# Patient Record
Sex: FEMALE | Race: BLACK OR AFRICAN AMERICAN | NOT HISPANIC OR LATINO | URBAN - METROPOLITAN AREA
[De-identification: names, ages, dates, MRNs, and addresses within clinical notes are randomized per-mention and may not be internally consistent; named-entity substitution may affect disease eponyms.]

---

## 2017-01-18 ENCOUNTER — EMERGENCY (EMERGENCY)
Facility: HOSPITAL | Age: 32
LOS: 1 days | Discharge: HOME | End: 2017-01-18
Attending: EMERGENCY MEDICINE | Admitting: EMERGENCY MEDICINE
Payer: COMMERCIAL

## 2017-01-18 VITALS
HEART RATE: 100 BPM | SYSTOLIC BLOOD PRESSURE: 150 MMHG | RESPIRATION RATE: 16 BRPM | OXYGEN SATURATION: 100 % | TEMPERATURE: 99 F | DIASTOLIC BLOOD PRESSURE: 91 MMHG

## 2017-01-18 VITALS
SYSTOLIC BLOOD PRESSURE: 123 MMHG | RESPIRATION RATE: 22 BRPM | HEART RATE: 91 BPM | TEMPERATURE: 98 F | OXYGEN SATURATION: 100 % | DIASTOLIC BLOOD PRESSURE: 70 MMHG

## 2017-01-18 LAB
ALBUMIN SERPL ELPH-MCNC: 4.1 G/DL — SIGNIFICANT CHANGE UP (ref 3.3–5)
ALP SERPL-CCNC: 93 U/L — SIGNIFICANT CHANGE UP (ref 40–120)
ALT FLD-CCNC: 12 U/L — SIGNIFICANT CHANGE UP (ref 4–33)
APPEARANCE UR: SIGNIFICANT CHANGE UP
AST SERPL-CCNC: 17 U/L — SIGNIFICANT CHANGE UP (ref 4–32)
BASOPHILS # BLD AUTO: 0.01 K/UL — SIGNIFICANT CHANGE UP (ref 0–0.2)
BASOPHILS NFR BLD AUTO: 0.1 % — SIGNIFICANT CHANGE UP (ref 0–2)
BILIRUB SERPL-MCNC: 0.4 MG/DL — SIGNIFICANT CHANGE UP (ref 0.2–1.2)
BILIRUB UR-MCNC: NEGATIVE — SIGNIFICANT CHANGE UP
BLOOD UR QL VISUAL: NEGATIVE — SIGNIFICANT CHANGE UP
BUN SERPL-MCNC: 12 MG/DL — SIGNIFICANT CHANGE UP (ref 7–23)
CALCIUM SERPL-MCNC: 9 MG/DL — SIGNIFICANT CHANGE UP (ref 8.4–10.5)
CHLORIDE SERPL-SCNC: 100 MMOL/L — SIGNIFICANT CHANGE UP (ref 98–107)
CO2 SERPL-SCNC: 24 MMOL/L — SIGNIFICANT CHANGE UP (ref 22–31)
COLOR SPEC: YELLOW — SIGNIFICANT CHANGE UP
CREAT SERPL-MCNC: 0.92 MG/DL — SIGNIFICANT CHANGE UP (ref 0.5–1.3)
EOSINOPHIL # BLD AUTO: 0.02 K/UL — SIGNIFICANT CHANGE UP (ref 0–0.5)
EOSINOPHIL NFR BLD AUTO: 0.2 % — SIGNIFICANT CHANGE UP (ref 0–6)
GLUCOSE SERPL-MCNC: 91 MG/DL — SIGNIFICANT CHANGE UP (ref 70–99)
GLUCOSE UR-MCNC: NEGATIVE — SIGNIFICANT CHANGE UP
HCT VFR BLD CALC: 40.4 % — SIGNIFICANT CHANGE UP (ref 34.5–45)
HGB BLD-MCNC: 12.8 G/DL — SIGNIFICANT CHANGE UP (ref 11.5–15.5)
IMM GRANULOCYTES NFR BLD AUTO: 0.2 % — SIGNIFICANT CHANGE UP (ref 0–1.5)
KETONES UR-MCNC: SIGNIFICANT CHANGE UP
LEUKOCYTE ESTERASE UR-ACNC: NEGATIVE — SIGNIFICANT CHANGE UP
LIDOCAIN IGE QN: 36 U/L — SIGNIFICANT CHANGE UP (ref 7–60)
LYMPHOCYTES # BLD AUTO: 1.02 K/UL — SIGNIFICANT CHANGE UP (ref 1–3.3)
LYMPHOCYTES # BLD AUTO: 12.7 % — LOW (ref 13–44)
MCHC RBC-ENTMCNC: 27 PG — SIGNIFICANT CHANGE UP (ref 27–34)
MCHC RBC-ENTMCNC: 31.7 % — LOW (ref 32–36)
MCV RBC AUTO: 85.2 FL — SIGNIFICANT CHANGE UP (ref 80–100)
MONOCYTES # BLD AUTO: 0.72 K/UL — SIGNIFICANT CHANGE UP (ref 0–0.9)
MONOCYTES NFR BLD AUTO: 9 % — SIGNIFICANT CHANGE UP (ref 2–14)
MUCOUS THREADS # UR AUTO: SIGNIFICANT CHANGE UP
NEUTROPHILS # BLD AUTO: 6.25 K/UL — SIGNIFICANT CHANGE UP (ref 1.8–7.4)
NEUTROPHILS NFR BLD AUTO: 77.8 % — HIGH (ref 43–77)
NITRITE UR-MCNC: NEGATIVE — SIGNIFICANT CHANGE UP
PH UR: 6 — SIGNIFICANT CHANGE UP (ref 4.6–8)
PLATELET # BLD AUTO: 376 K/UL — SIGNIFICANT CHANGE UP (ref 150–400)
PMV BLD: 9.7 FL — SIGNIFICANT CHANGE UP (ref 7–13)
POTASSIUM SERPL-MCNC: 3.8 MMOL/L — SIGNIFICANT CHANGE UP (ref 3.5–5.3)
POTASSIUM SERPL-SCNC: 3.8 MMOL/L — SIGNIFICANT CHANGE UP (ref 3.5–5.3)
PROT SERPL-MCNC: 7.8 G/DL — SIGNIFICANT CHANGE UP (ref 6–8.3)
PROT UR-MCNC: 20 — SIGNIFICANT CHANGE UP
RBC # BLD: 4.74 M/UL — SIGNIFICANT CHANGE UP (ref 3.8–5.2)
RBC # FLD: 13.5 % — SIGNIFICANT CHANGE UP (ref 10.3–14.5)
RBC CASTS # UR COMP ASSIST: SIGNIFICANT CHANGE UP (ref 0–?)
SODIUM SERPL-SCNC: 138 MMOL/L — SIGNIFICANT CHANGE UP (ref 135–145)
SP GR SPEC: 1.03 — HIGH (ref 1–1.03)
SQUAMOUS # UR AUTO: SIGNIFICANT CHANGE UP
UROBILINOGEN FLD QL: NORMAL E.U. — SIGNIFICANT CHANGE UP (ref 0.1–0.2)
WBC # BLD: 8.04 K/UL — SIGNIFICANT CHANGE UP (ref 3.8–10.5)
WBC # FLD AUTO: 8.04 K/UL — SIGNIFICANT CHANGE UP (ref 3.8–10.5)
WBC UR QL: SIGNIFICANT CHANGE UP (ref 0–?)

## 2017-01-18 PROCEDURE — 99284 EMERGENCY DEPT VISIT MOD MDM: CPT

## 2017-01-18 RX ORDER — ONDANSETRON 8 MG/1
4 TABLET, FILM COATED ORAL ONCE
Qty: 0 | Refills: 0 | Status: COMPLETED | OUTPATIENT
Start: 2017-01-18 | End: 2017-01-18

## 2017-01-18 RX ORDER — KETOROLAC TROMETHAMINE 30 MG/ML
15 SYRINGE (ML) INJECTION ONCE
Qty: 0 | Refills: 0 | Status: DISCONTINUED | OUTPATIENT
Start: 2017-01-18 | End: 2017-01-18

## 2017-01-18 RX ORDER — SODIUM CHLORIDE 9 MG/ML
1000 INJECTION INTRAMUSCULAR; INTRAVENOUS; SUBCUTANEOUS ONCE
Qty: 0 | Refills: 0 | Status: COMPLETED | OUTPATIENT
Start: 2017-01-18 | End: 2017-01-18

## 2017-01-18 RX ADMIN — Medication 15 MILLIGRAM(S): at 16:26

## 2017-01-18 RX ADMIN — SODIUM CHLORIDE 1000 MILLILITER(S): 9 INJECTION INTRAMUSCULAR; INTRAVENOUS; SUBCUTANEOUS at 14:14

## 2017-01-18 RX ADMIN — ONDANSETRON 4 MILLIGRAM(S): 8 TABLET, FILM COATED ORAL at 14:14

## 2017-01-18 NOTE — ED PROVIDER NOTE - ATTENDING CONTRIBUTION TO CARE
ED Attending Dr. Collier: 30 yo female with pre-eclampsia in the past, in ED with N/V/D and low abdominal pain beginning yesterday after eating pork chops, macaroni and cheese and beans and rice.  No others who ate the same food are ill as far as pt knows.  No dysuria, hematuria or CP/SOB.  On exam pt uncomfortable appearing, heart RRR, lungs CTAB, abd TTP in bilateral lower quadrants, extremities without swelling, strength 5/5 in all extremities and skin without rash.

## 2017-01-18 NOTE — ED PROVIDER NOTE - PROGRESS NOTE DETAILS
pt states abd pain and nausea resolved pt reports leg pain - toradol, po challenge and reassess PT seen and reassessed.  Patient symptomatically improved.   AAOX3, NAD, VSS.  Discussed test results w/ patient, given copy of results. Patient verbalized understanding of hospital course and outpatient plans, has decisional making capacity.  Will follow-up with Primary care doctor in the next 2 days; patient will call for an appointment. Will return to the ED if there is any worsening of symptoms.  Patient able to ambulate w/o difficulty, is tolerating PO intake

## 2017-01-18 NOTE — ED PROVIDER NOTE - MEDICAL DECISION MAKING DETAILS
32yo female with pmh of pre-eclampsia,  (csxn), LMP 1/10/17 presents with n/v/d/abd pain - likely gastro r/o pancreatitis and preg - labs, ivf, antiemetic, po trial

## 2017-01-18 NOTE — ED ADULT TRIAGE NOTE - CHIEF COMPLAINT QUOTE
c/o lower abdominal pain since this A.M., with N/V/D, and generalized body aches with fever. LMP: 01/10/17 PMH: pre eclampsia

## 2017-01-18 NOTE — ED ADULT NURSE NOTE - OBJECTIVE STATEMENT
Pt c/o lower abdominal pain n/v/d starting last night along with productive cough x 2 weeks and starting to feel feverish. Abdomen soft and tender in lower quadrants worse on lower right. Pt denies dysuria, chest pain, SOB, vaginal bleeding.

## 2017-01-18 NOTE — ED PROVIDER NOTE - OBJECTIVE STATEMENT
30yo female with pmh of pre-eclampsia,  (csxn), LMP 1/10/17 presents with n/v/d/abd pain. Pt states yesterday around 2pm she ate pork chops, mac and cheese and about 6-7 hours later started to have nausea, vomiting and diarrhea after several episodes of nbnb emesis she started to have abd pain. Pt states many bouts of watery diarrhea. Pt reports subjective fever today. Pt denies urinary symptoms, vaginal discharge/bleeding, cp/sob/palp, recent travel and sickness.

## 2017-06-26 PROBLEM — O14.90 UNSPECIFIED PRE-ECLAMPSIA, UNSPECIFIED TRIMESTER: Chronic | Status: ACTIVE | Noted: 2017-01-18

## 2017-07-17 ENCOUNTER — APPOINTMENT (OUTPATIENT)
Dept: OBGYN | Facility: CLINIC | Age: 32
End: 2017-07-17

## 2017-07-17 VITALS
BODY MASS INDEX: 39.27 KG/M2 | SYSTOLIC BLOOD PRESSURE: 140 MMHG | HEIGHT: 64 IN | WEIGHT: 230 LBS | DIASTOLIC BLOOD PRESSURE: 82 MMHG

## 2017-07-17 DIAGNOSIS — Z3A.17 17 WEEKS GESTATION OF PREGNANCY: ICD-10-CM

## 2017-07-17 DIAGNOSIS — O14.93 UNSPECIFIED PRE-ECLAMPSIA, THIRD TRIMESTER: ICD-10-CM

## 2017-07-17 DIAGNOSIS — N76.2 ACUTE VULVITIS: ICD-10-CM

## 2017-07-17 DIAGNOSIS — Z87.898 PERSONAL HISTORY OF OTHER SPECIFIED CONDITIONS: ICD-10-CM

## 2017-07-17 DIAGNOSIS — N88.3 INCOMPETENCE OF CERVIX UTERI: ICD-10-CM

## 2017-07-17 DIAGNOSIS — Z87.59 PERSONAL HISTORY OF OTHER COMPLICATIONS OF PREGNANCY, CHILDBIRTH AND THE PUERPERIUM: ICD-10-CM

## 2017-07-17 DIAGNOSIS — Z34.90 ENCOUNTER FOR SUPERVISION OF NORMAL PREGNANCY, UNSPECIFIED, UNSPECIFIED TRIMESTER: ICD-10-CM

## 2017-07-17 DIAGNOSIS — R63.3 FEEDING DIFFICULTIES: ICD-10-CM

## 2017-07-18 LAB
CANDIDA VAG CYTO: NOT DETECTED
G VAGINALIS+PREV SP MTYP VAG QL MICRO: NOT DETECTED
T VAGINALIS VAG QL WET PREP: NOT DETECTED

## 2017-07-19 LAB — HPV HIGH+LOW RISK DNA PNL CVX: NEGATIVE

## 2017-07-21 LAB
BACTERIA GENITAL AEROBE CULT: ABNORMAL
CYTOLOGY CVX/VAG DOC THIN PREP: NORMAL

## 2017-12-13 ENCOUNTER — TRANSCRIPTION ENCOUNTER (OUTPATIENT)
Age: 32
End: 2017-12-13

## 2018-07-19 ENCOUNTER — TRANSCRIPTION ENCOUNTER (OUTPATIENT)
Age: 33
End: 2018-07-19

## 2018-08-06 ENCOUNTER — APPOINTMENT (OUTPATIENT)
Dept: OBGYN | Facility: CLINIC | Age: 33
End: 2018-08-06
Payer: COMMERCIAL

## 2018-08-06 PROCEDURE — 99243 OFF/OP CNSLTJ NEW/EST LOW 30: CPT

## 2018-08-22 ENCOUNTER — OUTPATIENT (OUTPATIENT)
Dept: OUTPATIENT SERVICES | Facility: HOSPITAL | Age: 33
LOS: 1 days | Discharge: HOME | End: 2018-08-22

## 2018-08-22 DIAGNOSIS — D64.9 ANEMIA, UNSPECIFIED: ICD-10-CM

## 2018-08-24 ENCOUNTER — APPOINTMENT (OUTPATIENT)
Dept: MRI IMAGING | Facility: IMAGING CENTER | Age: 33
End: 2018-08-24
Payer: COMMERCIAL

## 2018-08-24 ENCOUNTER — OUTPATIENT (OUTPATIENT)
Dept: OUTPATIENT SERVICES | Facility: HOSPITAL | Age: 33
LOS: 1 days | End: 2018-08-24
Payer: COMMERCIAL

## 2018-08-24 DIAGNOSIS — Z00.8 ENCOUNTER FOR OTHER GENERAL EXAMINATION: ICD-10-CM

## 2018-08-24 PROCEDURE — 72197 MRI PELVIS W/O & W/DYE: CPT | Mod: 26

## 2018-08-24 PROCEDURE — 72197 MRI PELVIS W/O & W/DYE: CPT

## 2018-08-24 PROCEDURE — A9585: CPT

## 2018-08-31 ENCOUNTER — RESULT REVIEW (OUTPATIENT)
Age: 33
End: 2018-08-31

## 2018-08-31 ENCOUNTER — APPOINTMENT (OUTPATIENT)
Dept: HEMATOLOGY ONCOLOGY | Facility: CLINIC | Age: 33
End: 2018-08-31
Payer: COMMERCIAL

## 2018-08-31 VITALS
DIASTOLIC BLOOD PRESSURE: 70 MMHG | SYSTOLIC BLOOD PRESSURE: 130 MMHG | HEIGHT: 63.98 IN | OXYGEN SATURATION: 100 % | BODY MASS INDEX: 36.13 KG/M2 | WEIGHT: 211.64 LBS | RESPIRATION RATE: 16 BRPM | HEART RATE: 94 BPM

## 2018-08-31 DIAGNOSIS — Z87.42 PERSONAL HISTORY OF OTHER DISEASES OF THE FEMALE GENITAL TRACT: ICD-10-CM

## 2018-08-31 DIAGNOSIS — K21.9 GASTRO-ESOPHAGEAL REFLUX DISEASE W/OUT ESOPHAGITIS: ICD-10-CM

## 2018-08-31 DIAGNOSIS — D64.9 ANEMIA, UNSPECIFIED: ICD-10-CM

## 2018-08-31 DIAGNOSIS — Z80.0 FAMILY HISTORY OF MALIGNANT NEOPLASM OF DIGESTIVE ORGANS: ICD-10-CM

## 2018-08-31 DIAGNOSIS — E53.8 DEFICIENCY OF OTHER SPECIFIED B GROUP VITAMINS: ICD-10-CM

## 2018-08-31 DIAGNOSIS — N92.0 EXCESSIVE AND FREQUENT MENSTRUATION WITH REGULAR CYCLE: ICD-10-CM

## 2018-08-31 DIAGNOSIS — E55.9 VITAMIN D DEFICIENCY, UNSPECIFIED: ICD-10-CM

## 2018-08-31 LAB
ALBUMIN SERPL ELPH-MCNC: 4.1 G/DL
ALP BLD-CCNC: 74 U/L
ALT SERPL-CCNC: 9 U/L
AST SERPL-CCNC: 15 U/L
BILIRUB DIRECT SERPL-MCNC: 0.1 MG/DL
BILIRUB INDIRECT SERPL-MCNC: 0.2 MG/DL
BILIRUB SERPL-MCNC: 0.2 MG/DL
FERRITIN SERPL-MCNC: 27 NG/ML
FOLATE SERPL-MCNC: 8.1 NG/ML
HCT VFR BLD CALC: 33.1 % — LOW (ref 34.5–45)
HGB BLD-MCNC: 10.2 G/DL — LOW (ref 11.5–15.5)
IRON SATN MFR SERPL: 5 %
IRON SERPL-MCNC: 20 UG/DL
LDH SERPL-CCNC: 192 U/L
MCHC RBC-ENTMCNC: 23.3 PG — LOW (ref 27–34)
MCHC RBC-ENTMCNC: 30.8 G/DL — LOW (ref 32–36)
MCV RBC AUTO: 75.6 FL — LOW (ref 80–100)
PLATELET # BLD AUTO: 339 K/UL — SIGNIFICANT CHANGE UP (ref 150–400)
PROT SERPL-MCNC: 7 G/DL
RBC # BLD: 4.38 M/UL — SIGNIFICANT CHANGE UP (ref 3.8–5.2)
RBC # FLD: 21.2 % — HIGH (ref 10.3–14.5)
RETICS #: 130 K/UL — HIGH (ref 25–125)
RETICS/RBC NFR: 2.9 % — HIGH (ref 0.5–2.5)
TIBC SERPL-MCNC: 386 UG/DL
UIBC SERPL-MCNC: 366 UG/DL
VIT B12 SERPL-MCNC: 434 PG/ML
WBC # BLD: 6.4 K/UL — SIGNIFICANT CHANGE UP (ref 3.8–10.5)
WBC # FLD AUTO: 6.4 K/UL — SIGNIFICANT CHANGE UP (ref 3.8–10.5)

## 2018-08-31 PROCEDURE — 99205 OFFICE O/P NEW HI 60 MIN: CPT

## 2018-08-31 RX ORDER — CYANOCOBALAMIN/FOLIC ACID 1MG-400MCG
TABLET, SUBLINGUAL SUBLINGUAL
Refills: 0 | Status: ACTIVE | COMMUNITY

## 2018-08-31 RX ORDER — CHLORHEXIDINE GLUCONATE 4 %
325 (65 FE) LIQUID (ML) TOPICAL
Refills: 0 | Status: ACTIVE | COMMUNITY

## 2018-08-31 RX ORDER — TERCONAZOLE 4 MG/G
0.4 CREAM VAGINAL
Qty: 1 | Refills: 1 | Status: DISCONTINUED | COMMUNITY
Start: 2017-07-21 | End: 2018-08-31

## 2018-08-31 RX ORDER — ERGOCALCIFEROL (VITAMIN D2) 1250 MCG
50000 CAPSULE ORAL
Refills: 0 | Status: ACTIVE | COMMUNITY

## 2018-09-05 LAB — METHYLMALONATE SERPL-SCNC: 138 NMOL/L

## 2018-09-08 PROBLEM — Z80.0 FAMILY HISTORY OF PANCREATIC CANCER: Status: ACTIVE | Noted: 2018-09-08

## 2018-09-20 ENCOUNTER — OUTPATIENT (OUTPATIENT)
Dept: OUTPATIENT SERVICES | Facility: HOSPITAL | Age: 33
LOS: 1 days | End: 2018-09-20
Payer: COMMERCIAL

## 2018-09-20 VITALS
RESPIRATION RATE: 15 BRPM | WEIGHT: 210.1 LBS | OXYGEN SATURATION: 100 % | DIASTOLIC BLOOD PRESSURE: 87 MMHG | HEIGHT: 64 IN | TEMPERATURE: 98 F | SYSTOLIC BLOOD PRESSURE: 122 MMHG | HEART RATE: 76 BPM

## 2018-09-20 DIAGNOSIS — Z98.891 HISTORY OF UTERINE SCAR FROM PREVIOUS SURGERY: Chronic | ICD-10-CM

## 2018-09-20 DIAGNOSIS — Z98.890 OTHER SPECIFIED POSTPROCEDURAL STATES: Chronic | ICD-10-CM

## 2018-09-20 DIAGNOSIS — D25.9 LEIOMYOMA OF UTERUS, UNSPECIFIED: ICD-10-CM

## 2018-09-20 DIAGNOSIS — G47.33 OBSTRUCTIVE SLEEP APNEA (ADULT) (PEDIATRIC): ICD-10-CM

## 2018-09-20 DIAGNOSIS — Z01.818 ENCOUNTER FOR OTHER PREPROCEDURAL EXAMINATION: ICD-10-CM

## 2018-09-20 LAB
ANION GAP SERPL CALC-SCNC: 9 MMOL/L — SIGNIFICANT CHANGE UP (ref 5–17)
BLD GP AB SCN SERPL QL: NEGATIVE — SIGNIFICANT CHANGE UP
BUN SERPL-MCNC: 9 MG/DL — SIGNIFICANT CHANGE UP (ref 7–23)
CALCIUM SERPL-MCNC: 9 MG/DL — SIGNIFICANT CHANGE UP (ref 8.4–10.5)
CHLORIDE SERPL-SCNC: 105 MMOL/L — SIGNIFICANT CHANGE UP (ref 96–108)
CO2 SERPL-SCNC: 21 MMOL/L — LOW (ref 22–31)
CREAT SERPL-MCNC: 0.94 MG/DL — SIGNIFICANT CHANGE UP (ref 0.5–1.3)
GLUCOSE SERPL-MCNC: 78 MG/DL — SIGNIFICANT CHANGE UP (ref 70–99)
HCT VFR BLD CALC: 33.5 % — LOW (ref 34.5–45)
HGB BLD-MCNC: 10.2 G/DL — LOW (ref 11.5–15.5)
MCHC RBC-ENTMCNC: 23.2 PG — LOW (ref 27–34)
MCHC RBC-ENTMCNC: 30.4 GM/DL — LOW (ref 32–36)
MCV RBC AUTO: 76.1 FL — LOW (ref 80–100)
PLATELET # BLD AUTO: 406 K/UL — HIGH (ref 150–400)
POTASSIUM SERPL-MCNC: 4.1 MMOL/L — SIGNIFICANT CHANGE UP (ref 3.5–5.3)
POTASSIUM SERPL-SCNC: 4.1 MMOL/L — SIGNIFICANT CHANGE UP (ref 3.5–5.3)
RBC # BLD: 4.4 M/UL — SIGNIFICANT CHANGE UP (ref 3.8–5.2)
RBC # FLD: 22.1 % — HIGH (ref 10.3–14.5)
RH IG SCN BLD-IMP: POSITIVE — SIGNIFICANT CHANGE UP
SODIUM SERPL-SCNC: 135 MMOL/L — SIGNIFICANT CHANGE UP (ref 135–145)
WBC # BLD: 6.07 K/UL — SIGNIFICANT CHANGE UP (ref 3.8–10.5)
WBC # FLD AUTO: 6.07 K/UL — SIGNIFICANT CHANGE UP (ref 3.8–10.5)

## 2018-09-20 PROCEDURE — 86901 BLOOD TYPING SEROLOGIC RH(D): CPT

## 2018-09-20 PROCEDURE — 85027 COMPLETE CBC AUTOMATED: CPT

## 2018-09-20 PROCEDURE — G0463: CPT

## 2018-09-20 PROCEDURE — 86900 BLOOD TYPING SEROLOGIC ABO: CPT

## 2018-09-20 PROCEDURE — 86850 RBC ANTIBODY SCREEN: CPT

## 2018-09-20 PROCEDURE — 80048 BASIC METABOLIC PNL TOTAL CA: CPT

## 2018-09-20 RX ORDER — LIDOCAINE HCL 20 MG/ML
0.2 VIAL (ML) INJECTION ONCE
Qty: 0 | Refills: 0 | Status: DISCONTINUED | OUTPATIENT
Start: 2018-10-04 | End: 2018-10-04

## 2018-09-20 RX ORDER — FAMOTIDINE 10 MG/ML
20 INJECTION INTRAVENOUS ONCE
Qty: 0 | Refills: 0 | Status: COMPLETED | OUTPATIENT
Start: 2018-10-04 | End: 2018-10-04

## 2018-09-20 RX ORDER — ACETAMINOPHEN 500 MG
975 TABLET ORAL ONCE
Qty: 0 | Refills: 0 | Status: COMPLETED | OUTPATIENT
Start: 2018-10-04 | End: 2018-10-04

## 2018-09-20 RX ORDER — SODIUM CHLORIDE 9 MG/ML
3 INJECTION INTRAMUSCULAR; INTRAVENOUS; SUBCUTANEOUS EVERY 8 HOURS
Qty: 0 | Refills: 0 | Status: DISCONTINUED | OUTPATIENT
Start: 2018-10-04 | End: 2018-10-04

## 2018-09-20 RX ORDER — CELECOXIB 200 MG/1
200 CAPSULE ORAL ONCE
Qty: 0 | Refills: 0 | Status: COMPLETED | OUTPATIENT
Start: 2018-10-04 | End: 2018-10-04

## 2018-09-20 RX ORDER — CEFOTETAN DISODIUM 1 G
2 VIAL (EA) INJECTION ONCE
Qty: 0 | Refills: 0 | Status: DISCONTINUED | OUTPATIENT
Start: 2018-10-04 | End: 2018-10-08

## 2018-09-20 NOTE — H&P PST ADULT - PROBLEM SELECTOR PLAN 1
Scheduled exploratory laparotomy, laparoscopic myomectomy on 10/4/2018  Lab work sent at [facility]  Pre-op instructions given to pt, including chlorhexidine soap

## 2018-09-20 NOTE — H&P PST ADULT - HISTORY OF PRESENT ILLNESS
31 y/o AA female with PMHx of anemia (on Iron), DAYANA, asthma (controlled), GERD, 31 y/o AA female with PMHx of anemia (on Iron), DAYANA, asthma (controlled), GERD, c/o menorrhagia and pelvic pain especially during menstruation s/p diagnostic imaging revealed uterine fibroid. Evaluated by Dr. Sainz. Presents to PST for a scheduled exploratory laparotomy, laparoscopic myomectomy on 10/4/2018.

## 2018-09-20 NOTE — H&P PST ADULT - PMH
Anemia  takes Iron, follows bull Rick  Asthma  mild, controlled, never intubated or hospitalized  GERD (gastroesophageal reflux disease)    Leiomyoma of uterus    Pre-eclampsia    Vitamin D deficiency Anemia  takes Iron, follows bull Rick  Asthma  mild, controlled, never intubated or hospitalized  GERD (gastroesophageal reflux disease)    Leiomyoma of uterus    DAYANA (obstructive sleep apnea)  not on CPAP  Pre-eclampsia    Vitamin D deficiency

## 2018-10-03 ENCOUNTER — TRANSCRIPTION ENCOUNTER (OUTPATIENT)
Age: 33
End: 2018-10-03

## 2018-10-04 ENCOUNTER — RESULT REVIEW (OUTPATIENT)
Age: 33
End: 2018-10-04

## 2018-10-04 ENCOUNTER — APPOINTMENT (OUTPATIENT)
Dept: OBGYN | Facility: HOSPITAL | Age: 33
End: 2018-10-04

## 2018-10-04 ENCOUNTER — INPATIENT (INPATIENT)
Facility: HOSPITAL | Age: 33
LOS: 3 days | Discharge: HOME | DRG: 742 | End: 2018-10-08
Attending: OBSTETRICS & GYNECOLOGY | Admitting: OBSTETRICS & GYNECOLOGY
Payer: COMMERCIAL

## 2018-10-04 VITALS
SYSTOLIC BLOOD PRESSURE: 132 MMHG | WEIGHT: 210.1 LBS | HEART RATE: 106 BPM | RESPIRATION RATE: 18 BRPM | DIASTOLIC BLOOD PRESSURE: 79 MMHG | OXYGEN SATURATION: 100 % | TEMPERATURE: 99 F | HEIGHT: 64 IN

## 2018-10-04 DIAGNOSIS — D25.9 LEIOMYOMA OF UTERUS, UNSPECIFIED: ICD-10-CM

## 2018-10-04 DIAGNOSIS — Z09 ENCOUNTER FOR FOLLOW-UP EXAMINATION AFTER COMPLETED TREATMENT FOR CONDITIONS OTHER THAN MALIGNANT NEOPLASM: ICD-10-CM

## 2018-10-04 DIAGNOSIS — Z98.890 OTHER SPECIFIED POSTPROCEDURAL STATES: Chronic | ICD-10-CM

## 2018-10-04 DIAGNOSIS — Z98.891 HISTORY OF UTERINE SCAR FROM PREVIOUS SURGERY: Chronic | ICD-10-CM

## 2018-10-04 LAB — HCG UR QL: NEGATIVE — SIGNIFICANT CHANGE UP

## 2018-10-04 PROCEDURE — 88305 TISSUE EXAM BY PATHOLOGIST: CPT | Mod: 26

## 2018-10-04 PROCEDURE — 58146 MYOMECTOMY ABDOM COMPLEX: CPT | Mod: 80

## 2018-10-04 RX ORDER — HYDROMORPHONE HYDROCHLORIDE 2 MG/ML
0.5 INJECTION INTRAMUSCULAR; INTRAVENOUS; SUBCUTANEOUS
Qty: 0 | Refills: 0 | Status: DISCONTINUED | OUTPATIENT
Start: 2018-10-04 | End: 2018-10-06

## 2018-10-04 RX ORDER — PREGABALIN 225 MG/1
1 CAPSULE ORAL
Qty: 0 | Refills: 0 | COMMUNITY

## 2018-10-04 RX ORDER — NALOXONE HYDROCHLORIDE 4 MG/.1ML
0.1 SPRAY NASAL
Qty: 0 | Refills: 0 | Status: DISCONTINUED | OUTPATIENT
Start: 2018-10-04 | End: 2018-10-06

## 2018-10-04 RX ORDER — SIMETHICONE 80 MG/1
80 TABLET, CHEWABLE ORAL AT BEDTIME
Qty: 0 | Refills: 0 | Status: DISCONTINUED | OUTPATIENT
Start: 2018-10-04 | End: 2018-10-08

## 2018-10-04 RX ORDER — ONDANSETRON 8 MG/1
4 TABLET, FILM COATED ORAL EVERY 6 HOURS
Qty: 0 | Refills: 0 | Status: DISCONTINUED | OUTPATIENT
Start: 2018-10-04 | End: 2018-10-06

## 2018-10-04 RX ORDER — HYDROMORPHONE HYDROCHLORIDE 2 MG/ML
30 INJECTION INTRAMUSCULAR; INTRAVENOUS; SUBCUTANEOUS
Qty: 0 | Refills: 0 | Status: DISCONTINUED | OUTPATIENT
Start: 2018-10-04 | End: 2018-10-05

## 2018-10-04 RX ORDER — PANTOPRAZOLE SODIUM 20 MG/1
40 TABLET, DELAYED RELEASE ORAL
Qty: 0 | Refills: 0 | Status: DISCONTINUED | OUTPATIENT
Start: 2018-10-04 | End: 2018-10-08

## 2018-10-04 RX ORDER — ACETAMINOPHEN 500 MG
1000 TABLET ORAL ONCE
Qty: 0 | Refills: 0 | Status: COMPLETED | OUTPATIENT
Start: 2018-10-04 | End: 2018-10-04

## 2018-10-04 RX ORDER — ALBUTEROL 90 UG/1
2 AEROSOL, METERED ORAL EVERY 6 HOURS
Qty: 0 | Refills: 0 | Status: DISCONTINUED | OUTPATIENT
Start: 2018-10-04 | End: 2018-10-08

## 2018-10-04 RX ORDER — ALBUTEROL 90 UG/1
2 AEROSOL, METERED ORAL
Qty: 0 | Refills: 0 | COMMUNITY

## 2018-10-04 RX ORDER — SODIUM CHLORIDE 9 MG/ML
1000 INJECTION, SOLUTION INTRAVENOUS
Qty: 0 | Refills: 0 | Status: DISCONTINUED | OUTPATIENT
Start: 2018-10-04 | End: 2018-10-05

## 2018-10-04 RX ORDER — DOCUSATE SODIUM 100 MG
100 CAPSULE ORAL THREE TIMES A DAY
Qty: 0 | Refills: 0 | Status: DISCONTINUED | OUTPATIENT
Start: 2018-10-04 | End: 2018-10-05

## 2018-10-04 RX ORDER — PANTOPRAZOLE SODIUM 20 MG/1
1 TABLET, DELAYED RELEASE ORAL
Qty: 0 | Refills: 0 | COMMUNITY

## 2018-10-04 RX ORDER — ERGOCALCIFEROL 1.25 MG/1
1 CAPSULE ORAL
Qty: 0 | Refills: 0 | COMMUNITY

## 2018-10-04 RX ORDER — SODIUM CHLORIDE 9 MG/ML
1000 INJECTION, SOLUTION INTRAVENOUS
Qty: 0 | Refills: 0 | Status: DISCONTINUED | OUTPATIENT
Start: 2018-10-04 | End: 2018-10-04

## 2018-10-04 RX ADMIN — SODIUM CHLORIDE 100 MILLILITER(S): 9 INJECTION, SOLUTION INTRAVENOUS at 12:57

## 2018-10-04 RX ADMIN — SIMETHICONE 80 MILLIGRAM(S): 80 TABLET, CHEWABLE ORAL at 21:39

## 2018-10-04 RX ADMIN — Medication 100 MILLIGRAM(S): at 18:39

## 2018-10-04 RX ADMIN — HYDROMORPHONE HYDROCHLORIDE 30 MILLILITER(S): 2 INJECTION INTRAMUSCULAR; INTRAVENOUS; SUBCUTANEOUS at 16:31

## 2018-10-04 RX ADMIN — CELECOXIB 200 MILLIGRAM(S): 200 CAPSULE ORAL at 07:30

## 2018-10-04 RX ADMIN — FAMOTIDINE 20 MILLIGRAM(S): 10 INJECTION INTRAVENOUS at 07:30

## 2018-10-04 RX ADMIN — HYDROMORPHONE HYDROCHLORIDE 30 MILLILITER(S): 2 INJECTION INTRAMUSCULAR; INTRAVENOUS; SUBCUTANEOUS at 13:00

## 2018-10-04 RX ADMIN — HYDROMORPHONE HYDROCHLORIDE 30 MILLILITER(S): 2 INJECTION INTRAMUSCULAR; INTRAVENOUS; SUBCUTANEOUS at 19:25

## 2018-10-04 RX ADMIN — Medication 400 MILLIGRAM(S): at 14:40

## 2018-10-04 RX ADMIN — Medication 1000 MILLIGRAM(S): at 15:00

## 2018-10-04 RX ADMIN — Medication 975 MILLIGRAM(S): at 07:30

## 2018-10-04 RX ADMIN — HYDROMORPHONE HYDROCHLORIDE 0.5 MILLIGRAM(S): 2 INJECTION INTRAMUSCULAR; INTRAVENOUS; SUBCUTANEOUS at 13:30

## 2018-10-04 RX ADMIN — ONDANSETRON 4 MILLIGRAM(S): 8 TABLET, FILM COATED ORAL at 18:39

## 2018-10-04 RX ADMIN — HYDROMORPHONE HYDROCHLORIDE 30 MILLILITER(S): 2 INJECTION INTRAMUSCULAR; INTRAVENOUS; SUBCUTANEOUS at 17:22

## 2018-10-04 NOTE — PROGRESS NOTE ADULT - PROBLEM SELECTOR PLAN 1
-Neuro - AAO x 3, Pain well controlled with PCA in place  -Heme - hemodynamically stable  -CV - asymptomatic, CBC daily  -Pulm - encourage IS, O2sat   -GI - Diet-  Regular   Advance as Tolerated  - - Lockhart to gravity -clear urine, uterine lockhart in place  -DVT prophylaxis - SCDs in place, encourage ambulation  -Meds:  ALBUTerol    90 MICROgram(s) HFA Inhaler 2 Puff(s) Inhalation every 6 hours PRN  cefoTEtan  IVPB 2 Gram(s) IV Intermittent once  docusate sodium 100 milliGRAM(s) Oral three times a day PRN  doxycycline hyclate Capsule 100 milliGRAM(s) Oral every 12 hours  HYDROmorphone PCA (1 mG/mL) 30 milliLiter(s) PCA Continuous PCA Continuous  HYDROmorphone PCA (1 mG/mL) Rescue Clinician Bolus 0.5 milliGRAM(s) IV Push every 15 minutes PRN lactated ringers. 1000 milliLiter(s) IV Continuous <Continuous>  naloxone Injectable 0.1 milliGRAM(s) IV Push every 3 minutes PRN  ondansetron Injectable 4 milliGRAM(s) IV Push every 6 hours PRN  pantoprazole    Tablet 40 milliGRAM(s) Oral before breakfast  simethicone 80 milliGRAM(s) Chew at bedtime  -Dispo: stable for transfer from PACU, once meeting all PACU milestones

## 2018-10-04 NOTE — BRIEF OPERATIVE NOTE - PRE-OP DX
Intramural leiomyoma of uterus  10/04/2018    Active  Sarah Richards  Subserous leiomyoma of uterus  10/04/2018    Active  Sarah Richards

## 2018-10-04 NOTE — PROGRESS NOTE ADULT - SUBJECTIVE AND OBJECTIVE BOX
POST-OP CHECK  PA Note    Allergies    dust (Rhinorrhea; Rash)  No Known Drug Allergies  shellfish (Anaphylaxis)    Intolerances        S: Pt sleeping, easily arousable  Pain controlled. Pt denies N/V, SOB, CP, palpitations.   negative  PO   lockhart in place  O:   T(C): 36.4 (10-04-18 @ 15:00), Max: 37.3 (10-04-18 @ 12:10)  HR: 66 (10-04-18 @ 15:00) (66 - 86)  BP: 122/66 (10-04-18 @ 15:00) (110/56 - 122/67)  RR: 16 (10-04-18 @ 15:00) (12 - 16)  SpO2: 99% (10-04-18 @ 15:00) (98% - 100%)  Wt(kg): --  I&O's Summary    04 Oct 2018 07:01  -  04 Oct 2018 15:00  --------------------------------------------------------  IN: 300 mL / OUT: 225 mL / NET: 75 mL                         OR           PACU  (I)                              IVF LR@125  (O)  EBL    CV: RRR  Lungs: CTA B/L  Abd: slight BS x4Q, soft, appropriately tender  Inc: Clean/dry dressing in place  Ext: SCDs in place, Neg Hailyns B/L

## 2018-10-04 NOTE — BRIEF OPERATIVE NOTE - OPERATION/FINDINGS
pending EUA: myomatous uterus, 14 weeks in size. Intraoperatively: 13 weeks in size. 2, 3cm myomas present at the uterine fundus. Entry into the cavity on resection of these myomas. Pediatric lockhart placed into the uterus, inflated to 4cc. 9 cm left intramural myoma resected-necrotic. All myometrial defects closed in two layers with 0vlock. Endometrium reapproximated at the fundus with 2-0 vicryl in a running stitch. Evicel used over hysterotomies, Interced used over hysterotomies. Peritoneum reapproximated with 2-0 vicryl in a running stitch. Rectus reapproximated with 2-0 vicryl in two mattress sutures. Subcutaneous fat closed in multiple layers with 2-0 plain gut. Quill used to close skin.

## 2018-10-04 NOTE — PROGRESS NOTE ADULT - ASSESSMENT
A/P: 32y Female S/P abdominal myomectomy, lysis of adhesions, retrograde filled bladder with methylene blue  PAST MEDICAL & SURGICAL HISTORY:  DAYANA (obstructive sleep apnea): not on CPAP  Leiomyoma of uterus  GERD (gastroesophageal reflux disease)  Vitamin D deficiency  Asthma: mild, controlled, never intubated or hospitalized  Anemia: takes Iron, follows bull Rick  Pre-eclampsia  History of cervical cerclage:   History of D&C: missed    History of :

## 2018-10-04 NOTE — BRIEF OPERATIVE NOTE - PROCEDURE
<<-----Click on this checkbox to enter Procedure Abdominal myomectomy  10/04/2018    Active  [email or web address]

## 2018-10-05 ENCOUNTER — TRANSCRIPTION ENCOUNTER (OUTPATIENT)
Age: 33
End: 2018-10-05

## 2018-10-05 DIAGNOSIS — D25.9 LEIOMYOMA OF UTERUS, UNSPECIFIED: ICD-10-CM

## 2018-10-05 LAB
ANION GAP SERPL CALC-SCNC: 5 MMOL/L — SIGNIFICANT CHANGE UP (ref 5–17)
BASOPHILS # BLD AUTO: 0 K/UL — SIGNIFICANT CHANGE UP (ref 0–0.2)
BASOPHILS NFR BLD AUTO: 0.2 % — SIGNIFICANT CHANGE UP (ref 0–2)
BUN SERPL-MCNC: 6 MG/DL — LOW (ref 7–23)
CALCIUM SERPL-MCNC: 8.3 MG/DL — LOW (ref 8.4–10.5)
CHLORIDE SERPL-SCNC: 102 MMOL/L — SIGNIFICANT CHANGE UP (ref 96–108)
CO2 SERPL-SCNC: 27 MMOL/L — SIGNIFICANT CHANGE UP (ref 22–31)
CREAT SERPL-MCNC: 0.85 MG/DL — SIGNIFICANT CHANGE UP (ref 0.5–1.3)
EOSINOPHIL # BLD AUTO: 0 K/UL — SIGNIFICANT CHANGE UP (ref 0–0.5)
EOSINOPHIL NFR BLD AUTO: 0.1 % — SIGNIFICANT CHANGE UP (ref 0–6)
GLUCOSE SERPL-MCNC: 106 MG/DL — HIGH (ref 70–99)
HCT VFR BLD CALC: 24.1 % — LOW (ref 34.5–45)
HCT VFR BLD CALC: 24.5 % — LOW (ref 34.5–45)
HCT VFR BLD CALC: 25 % — LOW (ref 34.5–45)
HGB BLD-MCNC: 7.4 G/DL — LOW (ref 11.5–15.5)
HGB BLD-MCNC: 7.5 G/DL — LOW (ref 11.5–15.5)
HGB BLD-MCNC: 7.7 G/DL — LOW (ref 11.5–15.5)
LYMPHOCYTES # BLD AUTO: 1.8 K/UL — SIGNIFICANT CHANGE UP (ref 1–3.3)
LYMPHOCYTES # BLD AUTO: 13.9 % — SIGNIFICANT CHANGE UP (ref 13–44)
MCHC RBC-ENTMCNC: 23.6 PG — LOW (ref 27–34)
MCHC RBC-ENTMCNC: 23.9 PG — LOW (ref 27–34)
MCHC RBC-ENTMCNC: 24.2 PG — LOW (ref 27–34)
MCHC RBC-ENTMCNC: 30.2 GM/DL — LOW (ref 32–36)
MCHC RBC-ENTMCNC: 30.7 GM/DL — LOW (ref 32–36)
MCHC RBC-ENTMCNC: 31 GM/DL — LOW (ref 32–36)
MCV RBC AUTO: 77.9 FL — LOW (ref 80–100)
MCV RBC AUTO: 77.9 FL — LOW (ref 80–100)
MCV RBC AUTO: 78 FL — LOW (ref 80–100)
MONOCYTES # BLD AUTO: 0.9 K/UL — SIGNIFICANT CHANGE UP (ref 0–0.9)
MONOCYTES NFR BLD AUTO: 7.1 % — SIGNIFICANT CHANGE UP (ref 2–14)
NEUTROPHILS # BLD AUTO: 10 K/UL — HIGH (ref 1.8–7.4)
NEUTROPHILS NFR BLD AUTO: 78.7 % — HIGH (ref 43–77)
PLATELET # BLD AUTO: 470 K/UL — HIGH (ref 150–400)
PLATELET # BLD AUTO: 476 K/UL — HIGH (ref 150–400)
PLATELET # BLD AUTO: 485 K/UL — HIGH (ref 150–400)
POTASSIUM SERPL-MCNC: 4.1 MMOL/L — SIGNIFICANT CHANGE UP (ref 3.5–5.3)
POTASSIUM SERPL-SCNC: 4.1 MMOL/L — SIGNIFICANT CHANGE UP (ref 3.5–5.3)
RBC # BLD: 3.09 M/UL — LOW (ref 3.8–5.2)
RBC # BLD: 3.15 M/UL — LOW (ref 3.8–5.2)
RBC # BLD: 3.22 M/UL — LOW (ref 3.8–5.2)
RBC # FLD: 19.3 % — HIGH (ref 10.3–14.5)
RBC # FLD: 19.4 % — HIGH (ref 10.3–14.5)
RBC # FLD: 19.6 % — HIGH (ref 10.3–14.5)
SODIUM SERPL-SCNC: 134 MMOL/L — LOW (ref 135–145)
WBC # BLD: 12.5 K/UL — HIGH (ref 3.8–10.5)
WBC # BLD: 12.7 K/UL — HIGH (ref 3.8–10.5)
WBC # BLD: 12.8 K/UL — HIGH (ref 3.8–10.5)
WBC # FLD AUTO: 12.5 K/UL — HIGH (ref 3.8–10.5)
WBC # FLD AUTO: 12.7 K/UL — HIGH (ref 3.8–10.5)
WBC # FLD AUTO: 12.8 K/UL — HIGH (ref 3.8–10.5)

## 2018-10-05 RX ORDER — DOCUSATE SODIUM 100 MG
100 CAPSULE ORAL THREE TIMES A DAY
Qty: 0 | Refills: 0 | Status: DISCONTINUED | OUTPATIENT
Start: 2018-10-05 | End: 2018-10-08

## 2018-10-05 RX ORDER — IBUPROFEN 200 MG
600 TABLET ORAL EVERY 6 HOURS
Qty: 0 | Refills: 0 | Status: DISCONTINUED | OUTPATIENT
Start: 2018-10-05 | End: 2018-10-08

## 2018-10-05 RX ORDER — SODIUM CHLORIDE 9 MG/ML
1000 INJECTION, SOLUTION INTRAVENOUS ONCE
Qty: 0 | Refills: 0 | Status: COMPLETED | OUTPATIENT
Start: 2018-10-05 | End: 2018-10-05

## 2018-10-05 RX ORDER — ACETAMINOPHEN 500 MG
2 TABLET ORAL
Qty: 0 | Refills: 0 | COMMUNITY
Start: 2018-10-05

## 2018-10-05 RX ORDER — FERROUS SULFATE 325(65) MG
325 TABLET ORAL THREE TIMES A DAY
Qty: 0 | Refills: 0 | Status: DISCONTINUED | OUTPATIENT
Start: 2018-10-05 | End: 2018-10-08

## 2018-10-05 RX ORDER — INFLUENZA VIRUS VACCINE 15; 15; 15; 15 UG/.5ML; UG/.5ML; UG/.5ML; UG/.5ML
0.5 SUSPENSION INTRAMUSCULAR ONCE
Qty: 0 | Refills: 0 | Status: DISCONTINUED | OUTPATIENT
Start: 2018-10-05 | End: 2018-10-08

## 2018-10-05 RX ORDER — ASCORBIC ACID 60 MG
500 TABLET,CHEWABLE ORAL DAILY
Qty: 0 | Refills: 0 | Status: DISCONTINUED | OUTPATIENT
Start: 2018-10-05 | End: 2018-10-08

## 2018-10-05 RX ORDER — IBUPROFEN 200 MG
1 TABLET ORAL
Qty: 0 | Refills: 0 | COMMUNITY
Start: 2018-10-05

## 2018-10-05 RX ORDER — OXYCODONE HYDROCHLORIDE 5 MG/1
5 TABLET ORAL EVERY 4 HOURS
Qty: 0 | Refills: 0 | Status: DISCONTINUED | OUTPATIENT
Start: 2018-10-05 | End: 2018-10-08

## 2018-10-05 RX ORDER — OXYCODONE HYDROCHLORIDE 5 MG/1
1 TABLET ORAL
Qty: 20 | Refills: 0 | OUTPATIENT
Start: 2018-10-05

## 2018-10-05 RX ORDER — ACETAMINOPHEN 500 MG
650 TABLET ORAL EVERY 6 HOURS
Qty: 0 | Refills: 0 | Status: DISCONTINUED | OUTPATIENT
Start: 2018-10-05 | End: 2018-10-08

## 2018-10-05 RX ORDER — IPRATROPIUM/ALBUTEROL SULFATE 18-103MCG
3 AEROSOL WITH ADAPTER (GRAM) INHALATION EVERY 6 HOURS
Qty: 0 | Refills: 0 | Status: DISCONTINUED | OUTPATIENT
Start: 2018-10-05 | End: 2018-10-08

## 2018-10-05 RX ADMIN — Medication 325 MILLIGRAM(S): at 08:08

## 2018-10-05 RX ADMIN — Medication 100 MILLIGRAM(S): at 08:07

## 2018-10-05 RX ADMIN — OXYCODONE HYDROCHLORIDE 5 MILLIGRAM(S): 5 TABLET ORAL at 15:40

## 2018-10-05 RX ADMIN — Medication 325 MILLIGRAM(S): at 22:36

## 2018-10-05 RX ADMIN — SODIUM CHLORIDE 125 MILLILITER(S): 9 INJECTION, SOLUTION INTRAVENOUS at 18:49

## 2018-10-05 RX ADMIN — Medication 100 MILLIGRAM(S): at 05:17

## 2018-10-05 RX ADMIN — Medication 600 MILLIGRAM(S): at 17:42

## 2018-10-05 RX ADMIN — Medication 100 MILLIGRAM(S): at 17:41

## 2018-10-05 RX ADMIN — OXYCODONE HYDROCHLORIDE 5 MILLIGRAM(S): 5 TABLET ORAL at 23:36

## 2018-10-05 RX ADMIN — OXYCODONE HYDROCHLORIDE 5 MILLIGRAM(S): 5 TABLET ORAL at 19:30

## 2018-10-05 RX ADMIN — Medication 325 MILLIGRAM(S): at 13:14

## 2018-10-05 RX ADMIN — SODIUM CHLORIDE 1000 MILLILITER(S): 9 INJECTION, SOLUTION INTRAVENOUS at 14:23

## 2018-10-05 RX ADMIN — ONDANSETRON 4 MILLIGRAM(S): 8 TABLET, FILM COATED ORAL at 07:56

## 2018-10-05 RX ADMIN — Medication 600 MILLIGRAM(S): at 18:30

## 2018-10-05 RX ADMIN — Medication 100 MILLIGRAM(S): at 22:35

## 2018-10-05 RX ADMIN — Medication 3 MILLILITER(S): at 17:39

## 2018-10-05 RX ADMIN — SODIUM CHLORIDE 1000 MILLILITER(S): 9 INJECTION, SOLUTION INTRAVENOUS at 08:08

## 2018-10-05 RX ADMIN — PANTOPRAZOLE SODIUM 40 MILLIGRAM(S): 20 TABLET, DELAYED RELEASE ORAL at 05:17

## 2018-10-05 RX ADMIN — ALBUTEROL 2 PUFF(S): 90 AEROSOL, METERED ORAL at 13:13

## 2018-10-05 RX ADMIN — OXYCODONE HYDROCHLORIDE 5 MILLIGRAM(S): 5 TABLET ORAL at 18:47

## 2018-10-05 RX ADMIN — HYDROMORPHONE HYDROCHLORIDE 30 MILLILITER(S): 2 INJECTION INTRAMUSCULAR; INTRAVENOUS; SUBCUTANEOUS at 07:37

## 2018-10-05 RX ADMIN — SIMETHICONE 80 MILLIGRAM(S): 80 TABLET, CHEWABLE ORAL at 22:35

## 2018-10-05 RX ADMIN — OXYCODONE HYDROCHLORIDE 5 MILLIGRAM(S): 5 TABLET ORAL at 22:36

## 2018-10-05 RX ADMIN — Medication 500 MILLIGRAM(S): at 13:14

## 2018-10-05 RX ADMIN — Medication 100 MILLIGRAM(S): at 13:14

## 2018-10-05 RX ADMIN — OXYCODONE HYDROCHLORIDE 5 MILLIGRAM(S): 5 TABLET ORAL at 14:56

## 2018-10-05 NOTE — DISCHARGE NOTE ADULT - CARE PROVIDER_API CALL
Shaun Sainz), Obstetrics and Gynecology  [address] [place] [address]  Phone: [phone]  Fax: [phone]

## 2018-10-05 NOTE — PROGRESS NOTE ADULT - SUBJECTIVE AND OBJECTIVE BOX
R3 GYN Progress Note    Patient seen and examined at bedside, no acute overnight events. Pt states that she feels weak, but denies palpitations or light headedness. Notes nausea, but tolerating clears and denies emesis. Has not been out of bed. Has not yet passed flatus. Roach is still in place. Denies CP, SOB, fevers, and chills.    Vital Signs Last 24 Hours  T(C): 37 (10-05-18 @ 06:32), Max: 37.3 (10-04-18 @ 12:10)  HR: 58 (10-05-18 @ 06:32) (58 - 86)  BP: 105/66 (10-05-18 @ 06:32) (100/51 - 144/85)  RR: 18 (10-05-18 @ 06:32) (12 - 18)  SpO2: 97% (10-05-18 @ 06:32) (95% - 100%)    I&O's Summary    04 Oct 2018 07:01  -  05 Oct 2018 06:56  --------------------------------------------------------  IN: 2970 mL / OUT: 810 mL / NET: 2160 mL        Physical Exam:  General: NAD  CV: NR, RR, S1, S2, no M/R/G  Lungs: CTA-B  Abdomen: Soft, non-tender, non-distended, +BS  Incision: pfannenstiel incision, CDI  Ext: No pain or swelling    Labs:      MEDICATIONS  (STANDING):  cefoTEtan  IVPB 2 Gram(s) IV Intermittent once  doxycycline hyclate Capsule 100 milliGRAM(s) Oral every 12 hours  HYDROmorphone PCA (1 mG/mL) 30 milliLiter(s) PCA Continuous PCA Continuous  influenza   Vaccine 0.5 milliLiter(s) IntraMuscular once  lactated ringers. 1000 milliLiter(s) (125 mL/Hr) IV Continuous <Continuous>  pantoprazole    Tablet 40 milliGRAM(s) Oral before breakfast  simethicone 80 milliGRAM(s) Chew at bedtime    MEDICATIONS  (PRN):  ALBUTerol    90 MICROgram(s) HFA Inhaler 2 Puff(s) Inhalation every 6 hours PRN Shortness of Breath and/or Wheezing  docusate sodium 100 milliGRAM(s) Oral three times a day PRN Stool Softening  HYDROmorphone PCA (1 mG/mL) Rescue Clinician Bolus 0.5 milliGRAM(s) IV Push every 15 minutes PRN for Pain Scale GREATER THAN 6  naloxone Injectable 0.1 milliGRAM(s) IV Push every 3 minutes PRN For ANY of the following changes in patient status:  A. RR LESS THAN 10 breaths per minute, B. Oxygen saturation LESS THAN 90%, C. Sedation score of 6  ondansetron Injectable 4 milliGRAM(s) IV Push every 6 hours PRN Nausea

## 2018-10-05 NOTE — DISCHARGE NOTE ADULT - MEDICATION SUMMARY - MEDICATIONS TO TAKE
I will START or STAY ON the medications listed below when I get home from the hospital:    oxyCODONE 5 mg oral tablet  -- 1 tab(s) by mouth every 6 hours, As Needed -for severe pain MDD:4  -- Indication: For Severe pain    ibuprofen 600 mg oral tablet  -- 1 tab(s) by mouth every 6 hours  -- Indication: For Pain    acetaminophen 325 mg oral tablet  -- 2 tab(s) by mouth every 6 hours, As needed, Mild Pain (1 - 3)  -- Indication: For Pain    ProAir HFA 90 mcg/inh inhalation aerosol  -- 2 puff(s) inhaled 4 times a day, As Needed  -- Indication: For Home medication    ferrous sulfate 325 mg (65 mg elemental iron) oral tablet  -- 1 tab(s) by mouth 2 times a day  -- Indication: For Home medication    pantoprazole 40 mg oral delayed release tablet  -- 1 tab(s) by mouth once a day  -- Indication: For Home medication    Vitamin D2 50,000 intl units (1.25 mg) oral capsule  -- 1 cap(s) by mouth once a week  -- Indication: For Home medication    Vitamin B12 2500 mcg sublingual tablet  -- 1 tab(s) under tongue once a day  -- Indication: For Home medication I will START or STAY ON the medications listed below when I get home from the hospital:    oxyCODONE 5 mg oral tablet  -- 1 tab(s) by mouth every 6 hours, As Needed -for severe pain MDD:4  -- Indication: For Severe pain    ibuprofen 600 mg oral tablet  -- 1 tab(s) by mouth every 6 hours  -- Indication: For Pain    acetaminophen 325 mg oral tablet  -- 2 tab(s) by mouth every 6 hours, As needed, Mild Pain (1 - 3)  -- Indication: For Pain    doxycycline hyclate 100 mg oral capsule  -- 1 cap(s) by mouth 2 times a day   -- Avoid prolonged or excessive exposure to direct and/or artificial sunlight while taking this medication.  Do not take this drug if you are pregnant.  Finish all this medication unless otherwise directed by prescriber.  Medication should be taken with plenty of water.    -- Indication: For S/P myomectomy    ProAir HFA 90 mcg/inh inhalation aerosol  -- 2 puff(s) inhaled 4 times a day, As Needed  -- Indication: For Home medication    pantoprazole 40 mg oral delayed release tablet  -- 1 tab(s) by mouth once a day  -- Indication: For Home medication    Estrace 2 mg oral tablet  -- 1 tab(s) by mouth once a day   -- Do not take this drug if you are pregnant.  It is very important that you take or use this exactly as directed.  Do not skip doses or discontinue unless directed by your doctor.    -- Indication: For S/P myomectomy    Vitamin D2 50,000 intl units (1.25 mg) oral capsule  -- 1 cap(s) by mouth once a week  -- Indication: For Home medication    Vitamin B12 2500 mcg sublingual tablet  -- 1 tab(s) under tongue once a day  -- Indication: For Home medication

## 2018-10-05 NOTE — PROGRESS NOTE ADULT - SUBJECTIVE AND OBJECTIVE BOX
Pain Management Attending Addendum    SUBJECTIVE: Patient doing well with IV PCA    Therapy:    [X] IV PCA         [ ] PRN Analgesics    OBJECTIVE:   [X] Pain appropriately controlled    [ ] Other:    Side Effects:  [X] None	             [ ] Nausea              [ ] Pruritis                	[ ] Other:    ASSESSMENT/PLAN: Continue current therapy    Comments:  I discussed the case with the resident/NP and agree with their plan.

## 2018-10-05 NOTE — PROGRESS NOTE ADULT - SUBJECTIVE AND OBJECTIVE BOX
Day 1 of Anesthesia Pain Management Service    SUBJECTIVE: Patient is doing well with IV PCA    Pain Scale Score:	[X] Refer to charted pain scores    THERAPY:    [ ] IV PCA Morphine		[ ] 5 mg/mL	[ ] 1 mg/mL  [X] IV PCA Hydromorphone	[ ] 5 mg/mL	[X] 1 mg/mL  [ ] IV PCA Fentanyl		[ ] 50 micrograms/mL    Demand dose: 0.2 mg     Lockout: 6 minutes   Continuous Rate: 0 mg/hr  4 Hour Limit: 4 mg    MEDICATIONS  (STANDING):  ascorbic acid 500 milliGRAM(s) Oral daily  cefoTEtan  IVPB 2 Gram(s) IV Intermittent once  docusate sodium 100 milliGRAM(s) Oral three times a day  doxycycline hyclate Capsule 100 milliGRAM(s) Oral every 12 hours  ferrous    sulfate 325 milliGRAM(s) Oral three times a day  HYDROmorphone PCA (1 mG/mL) 30 milliLiter(s) PCA Continuous PCA Continuous  influenza   Vaccine 0.5 milliLiter(s) IntraMuscular once  lactated ringers. 1000 milliLiter(s) (125 mL/Hr) IV Continuous <Continuous>  pantoprazole    Tablet 40 milliGRAM(s) Oral before breakfast  simethicone 80 milliGRAM(s) Chew at bedtime    MEDICATIONS  (PRN):  ALBUTerol    90 MICROgram(s) HFA Inhaler 2 Puff(s) Inhalation every 6 hours PRN Shortness of Breath and/or Wheezing  HYDROmorphone PCA (1 mG/mL) Rescue Clinician Bolus 0.5 milliGRAM(s) IV Push every 15 minutes PRN for Pain Scale GREATER THAN 6  naloxone Injectable 0.1 milliGRAM(s) IV Push every 3 minutes PRN For ANY of the following changes in patient status:  A. RR LESS THAN 10 breaths per minute, B. Oxygen saturation LESS THAN 90%, C. Sedation score of 6  ondansetron Injectable 4 milliGRAM(s) IV Push every 6 hours PRN Nausea      OBJECTIVE:    Sedation Score:	[ X] Alert	[ ] Drowsy 	[ ] Arousable	[ ] Asleep	[ ] Unresponsive    Side Effects:	[X ] None	[ ] Nausea	[ ] Vomiting	[ ] Pruritus  		[ ] Other:    Vital Signs Last 24 Hrs  T(C): 36.7 (05 Oct 2018 09:54), Max: 37.3 (04 Oct 2018 12:10)  T(F): 98 (05 Oct 2018 09:54), Max: 99.1 (04 Oct 2018 12:10)  HR: 78 (05 Oct 2018 09:54) (58 - 86)  BP: 134/88 (05 Oct 2018 09:54) (100/51 - 144/85)  BP(mean): 88 (04 Oct 2018 14:00) (78 - 88)  RR: 18 (05 Oct 2018 09:54) (12 - 18)  SpO2: 98% (05 Oct 2018 09:54) (95% - 100%)    ASSESSMENT/ PLAN    Therapy to  be:               [X] Continued   [ ] Discontinued   [ ] Changed to PRN Analgesics    Documentation and Verification of current medications:   [X] Done	[ ] Not done, not eligible    Comments: Consider transition to po analgesics later today.

## 2018-10-05 NOTE — DISCHARGE NOTE ADULT - PLAN OF CARE
Return to baseline level of activity Regular diet as tolerated, regular activity as tolerated, no heavy lifting for first two weeks.  Nothing per vagina: no intercourse, tampons or douching.  Call your provider if you experience fevers, chills, worsening abdominal pain, inability to urinate or worsening vaginal bleeding.  Follow up with your provider in 2 weeks to have your uterine balloon removed. Continue to take your estrace and antibiotics as prescribed.

## 2018-10-05 NOTE — DISCHARGE NOTE ADULT - CARE PLAN
Principal Discharge DX:	Postoperative state  Goal:	Return to baseline level of activity  Assessment and plan of treatment:	Regular diet as tolerated, regular activity as tolerated, no heavy lifting for first two weeks.  Nothing per vagina: no intercourse, tampons or douching.  Call your provider if you experience fevers, chills, worsening abdominal pain, inability to urinate or worsening vaginal bleeding.  Follow up with your provider in 2 weeks to have your uterine balloon removed. Continue to take your estrace and antibiotics as prescribed.

## 2018-10-05 NOTE — DISCHARGE NOTE ADULT - INSTRUCTIONS
call pmd if you have fever greater than 100 degrees, if you have pain unrelieved by medication ,  if your incision site becomes red, swollen, warm to touch,  if you see any oozing from site, or if you see fresh red blood.  call if you have difficulty urinating or if you have nausea or vomiting

## 2018-10-05 NOTE — DISCHARGE NOTE ADULT - HOSPITAL COURSE
Patient underwent an uncomplicated abdominal myomectomy and SHLOMO for fibroids. , H/H as below. Patient had some low urine output on POD#1 which improved following IV bolus and ambulation. Patient’s postoperative course was otherwise unremarkable and she remained hemodynamically stable and afebrile throughout. Upon discharge on POD#_, the patient is ambulating and voiding spontaneously, tolerating oral intake, pain was well controlled with oral medication, and vital signs were stable. Patient underwent an uncomplicated abdominal myomectomy and SHLOMO for fibroids. , H/H as below. Patient had some low urine output on POD#1 which improved following IV bolus and ambulation. On POD#3, patient c/o dizziness upon standing with a Hct: 22.2 and received 2 units PRBC with improved symptoms. POD#4, Hct returned to 30.4 with an absence of dizziness. Upon discharge on POD#4, the patient is ambulating and voiding spontaneously, tolerating oral intake, pain was well controlled with oral medication, and vital signs were stable.

## 2018-10-05 NOTE — PROGRESS NOTE ADULT - ATTENDING COMMENTS
Pt seen and examined. Routine postop care. h/h stable. cont to monitor UOP with lockhart until adequate then dc lockhart. advance diet and d/c PCA. Increase ambulation.

## 2018-10-05 NOTE — CHART NOTE - NSCHARTNOTEFT_GEN_A_CORE
NP Chart Note:    at the bedside to help with ambulation. patient c/o increased shortness of breath when standing that is absent when lying down. patient denies chest pain, dizziness. denies leg pain.  Denies nausea or vomiting, however pt reports is unable to eat.     Vital Signs Last 24 Hrs  T(C): 36.7 (05 Oct 2018 09:54), Max: 37 (05 Oct 2018 06:32)  T(F): 98 (05 Oct 2018 09:54), Max: 98.6 (05 Oct 2018 06:32)  HR: 89 (05 Oct 2018 12:10) (58 - 89)  BP: 132/71 (05 Oct 2018 12:10) (100/51 - 144/85)  BP(mean): 89 (05 Oct 2018 12:10) (87 - 89)  RR: 18 (05 Oct 2018 12:10) (12 - 18)  SpO2: 95% (05 Oct 2018 12:10) (95% - 100%)    orthostatic hypotension: patient unable to complete standing portion of orthostatics d/t shortness of breath, however lying down 132/71, P: 85, sp02 sat: 96%, sittin/63, p: 85, spo2:96%.     urine output since 7a: 200mL yellow urine                        7.7    12.8  )-----------( 485      ( 05 Oct 2018 10:38 )             25.0     gen: A&Ox3  CV: rrr s1s2   abd: soft, appropriately tender post-op, BS x 4 quadrants, non-distended  ext: bilateral pedal pulses palpated    31 y/o POD#1 s/p abd. myomectomy, SHLOMO with shortness of breath secondary to likely asthma    -CBC,BMP  -IV bolus secondary to urine output  -pro-air for shortness of breath  -monitor vital signs  -encourage ambulation with assistance   -incentive spirometer remains at bedside and used by patient    d/w Dr. Alistair Chamberlain NP

## 2018-10-05 NOTE — PROGRESS NOTE ADULT - PROBLEM SELECTOR PLAN 1
Neuro: transition to po pain meds once tolerating more PO  CV: Hemodynamically stable, f/u AM CBC  Pulm: Saturating well on room air, encourage incentive spirometry  GI: Advance to regular diet as tolerated  : UOP not adequate, will continue IV hydration and f/u AM CBC   Heme: c/w HSQ and SCDs for DVT ppx  ID: c/w doxycyline for infection ppx due to uterine balloon in situ  Dispo: Continue routine post-op care    Piedad, PGY-3

## 2018-10-05 NOTE — DISCHARGE NOTE ADULT - PATIENT PORTAL LINK FT
You can access the [facility] Patient Portal, offered by [facility], by registering with the following website: [email or web address]

## 2018-10-06 DIAGNOSIS — Z98.890 OTHER SPECIFIED POSTPROCEDURAL STATES: ICD-10-CM

## 2018-10-06 LAB
ANION GAP SERPL CALC-SCNC: 8 MMOL/L — SIGNIFICANT CHANGE UP (ref 5–17)
BUN SERPL-MCNC: 6 MG/DL — LOW (ref 7–23)
CALCIUM SERPL-MCNC: 8.3 MG/DL — LOW (ref 8.4–10.5)
CHLORIDE SERPL-SCNC: 105 MMOL/L — SIGNIFICANT CHANGE UP (ref 96–108)
CO2 SERPL-SCNC: 25 MMOL/L — SIGNIFICANT CHANGE UP (ref 22–31)
CREAT SERPL-MCNC: 0.95 MG/DL — SIGNIFICANT CHANGE UP (ref 0.5–1.3)
GLUCOSE SERPL-MCNC: 92 MG/DL — SIGNIFICANT CHANGE UP (ref 70–99)
HCT VFR BLD CALC: 23.5 % — LOW (ref 34.5–45)
HGB BLD-MCNC: 7.1 G/DL — LOW (ref 11.5–15.5)
MCHC RBC-ENTMCNC: 23.5 PG — LOW (ref 27–34)
MCHC RBC-ENTMCNC: 30.3 GM/DL — LOW (ref 32–36)
MCV RBC AUTO: 77.5 FL — LOW (ref 80–100)
PLATELET # BLD AUTO: 498 K/UL — HIGH (ref 150–400)
POTASSIUM SERPL-MCNC: 3.6 MMOL/L — SIGNIFICANT CHANGE UP (ref 3.5–5.3)
POTASSIUM SERPL-SCNC: 3.6 MMOL/L — SIGNIFICANT CHANGE UP (ref 3.5–5.3)
RBC # BLD: 3.03 M/UL — LOW (ref 3.8–5.2)
RBC # FLD: 19.6 % — HIGH (ref 10.3–14.5)
SODIUM SERPL-SCNC: 138 MMOL/L — SIGNIFICANT CHANGE UP (ref 135–145)
WBC # BLD: 10.6 K/UL — HIGH (ref 3.8–10.5)
WBC # FLD AUTO: 10.6 K/UL — HIGH (ref 3.8–10.5)

## 2018-10-06 PROCEDURE — 71046 X-RAY EXAM CHEST 2 VIEWS: CPT | Mod: 26

## 2018-10-06 RX ORDER — OXYCODONE HYDROCHLORIDE 5 MG/1
5 TABLET ORAL ONCE
Qty: 0 | Refills: 0 | Status: DISCONTINUED | OUTPATIENT
Start: 2018-10-06 | End: 2018-10-06

## 2018-10-06 RX ADMIN — Medication 600 MILLIGRAM(S): at 23:23

## 2018-10-06 RX ADMIN — Medication 3 MILLILITER(S): at 00:11

## 2018-10-06 RX ADMIN — Medication 500 MILLIGRAM(S): at 11:18

## 2018-10-06 RX ADMIN — Medication 100 MILLIGRAM(S): at 06:16

## 2018-10-06 RX ADMIN — OXYCODONE HYDROCHLORIDE 5 MILLIGRAM(S): 5 TABLET ORAL at 22:18

## 2018-10-06 RX ADMIN — Medication 600 MILLIGRAM(S): at 11:19

## 2018-10-06 RX ADMIN — Medication 325 MILLIGRAM(S): at 06:17

## 2018-10-06 RX ADMIN — Medication 100 MILLIGRAM(S): at 13:14

## 2018-10-06 RX ADMIN — Medication 600 MILLIGRAM(S): at 01:12

## 2018-10-06 RX ADMIN — Medication 600 MILLIGRAM(S): at 18:27

## 2018-10-06 RX ADMIN — Medication 600 MILLIGRAM(S): at 06:17

## 2018-10-06 RX ADMIN — OXYCODONE HYDROCHLORIDE 5 MILLIGRAM(S): 5 TABLET ORAL at 03:12

## 2018-10-06 RX ADMIN — Medication 325 MILLIGRAM(S): at 22:19

## 2018-10-06 RX ADMIN — PANTOPRAZOLE SODIUM 40 MILLIGRAM(S): 20 TABLET, DELAYED RELEASE ORAL at 06:16

## 2018-10-06 RX ADMIN — OXYCODONE HYDROCHLORIDE 5 MILLIGRAM(S): 5 TABLET ORAL at 18:29

## 2018-10-06 RX ADMIN — SIMETHICONE 80 MILLIGRAM(S): 80 TABLET, CHEWABLE ORAL at 22:18

## 2018-10-06 RX ADMIN — OXYCODONE HYDROCHLORIDE 5 MILLIGRAM(S): 5 TABLET ORAL at 13:14

## 2018-10-06 RX ADMIN — ALBUTEROL 2 PUFF(S): 90 AEROSOL, METERED ORAL at 09:57

## 2018-10-06 RX ADMIN — Medication 100 MILLIGRAM(S): at 18:27

## 2018-10-06 RX ADMIN — Medication 3 MILLILITER(S): at 23:23

## 2018-10-06 RX ADMIN — OXYCODONE HYDROCHLORIDE 5 MILLIGRAM(S): 5 TABLET ORAL at 02:10

## 2018-10-06 RX ADMIN — Medication 3 MILLILITER(S): at 18:27

## 2018-10-06 RX ADMIN — Medication 600 MILLIGRAM(S): at 00:12

## 2018-10-06 RX ADMIN — OXYCODONE HYDROCHLORIDE 5 MILLIGRAM(S): 5 TABLET ORAL at 06:15

## 2018-10-06 RX ADMIN — OXYCODONE HYDROCHLORIDE 5 MILLIGRAM(S): 5 TABLET ORAL at 09:58

## 2018-10-06 RX ADMIN — Medication 325 MILLIGRAM(S): at 13:14

## 2018-10-06 RX ADMIN — Medication 100 MILLIGRAM(S): at 06:15

## 2018-10-06 RX ADMIN — Medication 650 MILLIGRAM(S): at 09:58

## 2018-10-06 RX ADMIN — Medication 100 MILLIGRAM(S): at 22:19

## 2018-10-06 RX ADMIN — Medication 3 MILLILITER(S): at 11:23

## 2018-10-06 NOTE — PROGRESS NOTE ADULT - ATTENDING COMMENTS
Patient personally seen and examined by the bedside. She is POD2 from an abdominal myomectomy, uncomplicated. She is doing well overall, however is complaining of a productive cough and shortness of breath on ambulation. Admits to having URI symptoms prior to surgery. She is saturating 95-98%, afebrile and non-tachycardic. Lungs are CTAB.  - Will obtain CXR to ro infective lung process, low suspicion for PE at this time   - Endometrial lockhart replaced, balloon insufflated to 3cc  - Will monitor overnight.   - AM CBC  - Encourage IS  - Patient otherwise meeting all post-operative milestones  - Continue routine post-opertive care    Sarah Richards MD

## 2018-10-06 NOTE — PROGRESS NOTE ADULT - PROBLEM SELECTOR PLAN 1
Neuro: c/w po pain meds  CV: Hemodynamically stable  Pulm: Saturating well on room air, encourage incentive spirometry  GI: c/w regular diet  : voiding spontaneously  Heme: c/w HSQ and SCDs for DVT ppx  Dispo: Continue routine post-op care    KENYON Valadez, PGY-2

## 2018-10-06 NOTE — PROGRESS NOTE ADULT - SUBJECTIVE AND OBJECTIVE BOX
Day 2__ of Anesthesia Pain Management Service    SUBJECTIVE: Patient is doing well with IV PCA    Pain Scale Score:	[X] Refer to charted pain scores    THERAPY:    [ ] IV PCA Morphine		[ ] 5 mg/mL	[ ] 1 mg/mL  [X] IV PCA Hydromorphone	[ ] 5 mg/mL	[X] 1 mg/mL  [ ] IV PCA Fentanyl		[ ] 50 micrograms/mL    Demand dose: 0.2 mg     Lockout: 6 minutes   Continuous Rate: 0 mg/hr  4 Hour Limit: 4 mg    MEDICATIONS  (STANDING):  ALBUTerol/ipratropium for Nebulization 3 milliLiter(s) Nebulizer every 6 hours  ascorbic acid 500 milliGRAM(s) Oral daily  cefoTEtan  IVPB 2 Gram(s) IV Intermittent once  docusate sodium 100 milliGRAM(s) Oral three times a day  doxycycline hyclate Capsule 100 milliGRAM(s) Oral every 12 hours  ferrous    sulfate 325 milliGRAM(s) Oral three times a day  ibuprofen  Tablet. 600 milliGRAM(s) Oral every 6 hours  influenza   Vaccine 0.5 milliLiter(s) IntraMuscular once  oxyCODONE    IR 5 milliGRAM(s) Oral every 4 hours  pantoprazole    Tablet 40 milliGRAM(s) Oral before breakfast  simethicone 80 milliGRAM(s) Chew at bedtime    MEDICATIONS  (PRN):  acetaminophen   Tablet .. 650 milliGRAM(s) Oral every 6 hours PRN Mild Pain (1 - 3)  ALBUTerol    90 MICROgram(s) HFA Inhaler 2 Puff(s) Inhalation every 6 hours PRN Shortness of Breath and/or Wheezing      OBJECTIVE:    Sedation Score:	[ X] Alert	[ ] Drowsy 	[ ] Arousable	[ ] Asleep	[ ] Unresponsive    Side Effects:	[X ] None	[ ] Nausea	[ ] Vomiting	[ ] Pruritus  		[ ] Other:    Vital Signs Last 24 Hrs  T(C): 37 (06 Oct 2018 10:18), Max: 37.8 (05 Oct 2018 16:34)  T(F): 98.6 (06 Oct 2018 10:18), Max: 100 (05 Oct 2018 16:34)  HR: 96 (06 Oct 2018 10:18) (74 - 96)  BP: 125/79 (06 Oct 2018 10:18) (115/76 - 137/79)  BP(mean): 89 (05 Oct 2018 12:10) (89 - 89)  RR: 18 (06 Oct 2018 10:18) (17 - 18)  SpO2: 97% (06 Oct 2018 10:18) (93% - 99%)    ASSESSMENT/ PLAN    Therapy to  be:               [ Continued   [ x] Discontinued   [x ] Changed to PRN Analgesics    Documentation and Verification of current medications:   [X] Done	[ ] Not done, not eligible    Comments:

## 2018-10-06 NOTE — PROGRESS NOTE ADULT - SUBJECTIVE AND OBJECTIVE BOX
GYN Progress Note, HD#3, POD#2     Patient seen and examined at bedside. Uterine lockhart fell out overnight. Reports minimal vaginal bleeding at this time. No acute complaints, pain well controlled.  Patient is ambulating in hallways, voiding spontaneously, and tolerating regular diet. Has not yet passed flatus. Denies CP, SOB, N/V, fevers, and chills.    Vital Signs Last 24 Hours  T(C): 37 (10-06-18 @ 01:20), Max: 37.8 (10-05-18 @ 16:34)  HR: 87 (10-05-18 @ 21:24) (74 - 89)  BP: 117/76 (10-06-18 @ 01:20) (115/76 - 137/79)  RR: 18 (10-06-18 @ 01:20) (17 - 18)  SpO2: 99% (10-06-18 @ 01:20) (93% - 99%)    I&O's Summary    05 Oct 2018 07:01  -  06 Oct 2018 07:00  --------------------------------------------------------  IN: 2660 mL / OUT: 1440 mL / NET: 1220 mL        Physical Exam:  General: NAD  CV: NR, RR, S1, S2, no M/R/G  Lungs: CTA-B  Abdomen: Soft, non-tender, non-distended, +BS normoactive   Incision: CDI w/ steri strips in place   Ext: No pain or swelling    Labs:             7.5<L>  12.7<H> )-----------( 470<H>    ( 10-05 @ 13:55 )             24.1<L>               7.7<L>  12.8<H> )-----------( 485<H>    ( 10-05 @ 10:38 )             25.0<L>               7.4<L>  12.5<H> )-----------( 476<H>    ( 10-05 @ 07:14 )             24.5<L>        MEDICATIONS  (STANDING):  ALBUTerol/ipratropium for Nebulization 3 milliLiter(s) Nebulizer every 6 hours  ascorbic acid 500 milliGRAM(s) Oral daily  cefoTEtan  IVPB 2 Gram(s) IV Intermittent once  docusate sodium 100 milliGRAM(s) Oral three times a day  doxycycline hyclate Capsule 100 milliGRAM(s) Oral every 12 hours  ferrous    sulfate 325 milliGRAM(s) Oral three times a day  ibuprofen  Tablet. 600 milliGRAM(s) Oral every 6 hours  influenza   Vaccine 0.5 milliLiter(s) IntraMuscular once  oxyCODONE    IR 5 milliGRAM(s) Oral every 4 hours  pantoprazole    Tablet 40 milliGRAM(s) Oral before breakfast  simethicone 80 milliGRAM(s) Chew at bedtime    MEDICATIONS  (PRN):  acetaminophen   Tablet .. 650 milliGRAM(s) Oral every 6 hours PRN Mild Pain (1 - 3)  ALBUTerol    90 MICROgram(s) HFA Inhaler 2 Puff(s) Inhalation every 6 hours PRN Shortness of Breath and/or Wheezing  HYDROmorphone PCA (1 mG/mL) Rescue Clinician Bolus 0.5 milliGRAM(s) IV Push every 15 minutes PRN for Pain Scale GREATER THAN 6  naloxone Injectable 0.1 milliGRAM(s) IV Push every 3 minutes PRN For ANY of the following changes in patient status:  A. RR LESS THAN 10 breaths per minute, B. Oxygen saturation LESS THAN 90%, C. Sedation score of 6  ondansetron Injectable 4 milliGRAM(s) IV Push every 6 hours PRN Nausea GYN Progress Note, HD#3, POD#2     Patient seen and examined at bedside. Uterine lockhart fell out overnight. Reports minimal vaginal bleeding at this time. No acute complaints, pain well controlled.  Patient is ambulating in hallways, voiding spontaneously, and tolerating regular diet. Has not yet passed flatus. Denies CP, SOB, N/V, fevers, and chills.    Vital Signs Last 24 Hours  T(C): 37 (10-06-18 @ 01:20), Max: 37.8 (10-05-18 @ 16:34)  HR: 87 (10-05-18 @ 21:24) (74 - 89)  BP: 117/76 (10-06-18 @ 01:20) (115/76 - 137/79)  RR: 18 (10-06-18 @ 01:20) (17 - 18)  SpO2: 99% (10-06-18 @ 01:20) (93% - 99%)    I&O's Summary    05 Oct 2018 07:01  -  06 Oct 2018 07:00  --------------------------------------------------------  IN: 2660 mL / OUT: 1440 mL / NET: 1220 mL        Physical Exam:  General: NAD  CV: NR, RR, S1, S2, no M/R/G  Lungs: CTA-B  Abdomen: Soft, non-tender, non-distended, +BS normoactive   Incision: CDI w/ steri strips in place   Ext: No pain or swelling    Labs:                        7.1    10.6  )-----------( 498      ( 06 Oct 2018 07:34 )             23.5                7.5<L>  12.7<H> )-----------( 470<H>    ( 10-05 @ 13:55 )             24.1<L>               7.7<L>  12.8<H> )-----------( 485<H>    ( 10-05 @ 10:38 )             25.0<L>               7.4<L>  12.5<H> )-----------( 476<H>    ( 10-05 @ 07:14 )             24.5<L>        MEDICATIONS  (STANDING):  ALBUTerol/ipratropium for Nebulization 3 milliLiter(s) Nebulizer every 6 hours  ascorbic acid 500 milliGRAM(s) Oral daily  cefoTEtan  IVPB 2 Gram(s) IV Intermittent once  docusate sodium 100 milliGRAM(s) Oral three times a day  doxycycline hyclate Capsule 100 milliGRAM(s) Oral every 12 hours  ferrous    sulfate 325 milliGRAM(s) Oral three times a day  ibuprofen  Tablet. 600 milliGRAM(s) Oral every 6 hours  influenza   Vaccine 0.5 milliLiter(s) IntraMuscular once  oxyCODONE    IR 5 milliGRAM(s) Oral every 4 hours  pantoprazole    Tablet 40 milliGRAM(s) Oral before breakfast  simethicone 80 milliGRAM(s) Chew at bedtime    MEDICATIONS  (PRN):  acetaminophen   Tablet .. 650 milliGRAM(s) Oral every 6 hours PRN Mild Pain (1 - 3)  ALBUTerol    90 MICROgram(s) HFA Inhaler 2 Puff(s) Inhalation every 6 hours PRN Shortness of Breath and/or Wheezing  HYDROmorphone PCA (1 mG/mL) Rescue Clinician Bolus 0.5 milliGRAM(s) IV Push every 15 minutes PRN for Pain Scale GREATER THAN 6  naloxone Injectable 0.1 milliGRAM(s) IV Push every 3 minutes PRN For ANY of the following changes in patient status:  A. RR LESS THAN 10 breaths per minute, B. Oxygen saturation LESS THAN 90%, C. Sedation score of 6  ondansetron Injectable 4 milliGRAM(s) IV Push every 6 hours PRN Nausea

## 2018-10-07 LAB
BLD GP AB SCN SERPL QL: NEGATIVE — SIGNIFICANT CHANGE UP
HCT VFR BLD CALC: 22.2 % — LOW (ref 34.5–45)
HCT VFR BLD CALC: 22.6 % — LOW (ref 34.5–45)
HGB BLD-MCNC: 6.6 G/DL — CRITICAL LOW (ref 11.5–15.5)
HGB BLD-MCNC: 7 G/DL — CRITICAL LOW (ref 11.5–15.5)
MCHC RBC-ENTMCNC: 22.7 PG — LOW (ref 27–34)
MCHC RBC-ENTMCNC: 24.4 PG — LOW (ref 27–34)
MCHC RBC-ENTMCNC: 29.4 GM/DL — LOW (ref 32–36)
MCHC RBC-ENTMCNC: 31.4 GM/DL — LOW (ref 32–36)
MCV RBC AUTO: 77.2 FL — LOW (ref 80–100)
MCV RBC AUTO: 77.9 FL — LOW (ref 80–100)
PLATELET # BLD AUTO: 475 K/UL — HIGH (ref 150–400)
PLATELET # BLD AUTO: 502 K/UL — HIGH (ref 150–400)
RBC # BLD: 2.84 M/UL — LOW (ref 3.8–5.2)
RBC # BLD: 2.93 M/UL — LOW (ref 3.8–5.2)
RBC # FLD: 19.6 % — HIGH (ref 10.3–14.5)
RBC # FLD: 19.7 % — HIGH (ref 10.3–14.5)
RH IG SCN BLD-IMP: POSITIVE — SIGNIFICANT CHANGE UP
WBC # BLD: 8.6 K/UL — SIGNIFICANT CHANGE UP (ref 3.8–10.5)
WBC # BLD: 8.8 K/UL — SIGNIFICANT CHANGE UP (ref 3.8–10.5)
WBC # FLD AUTO: 8.6 K/UL — SIGNIFICANT CHANGE UP (ref 3.8–10.5)
WBC # FLD AUTO: 8.8 K/UL — SIGNIFICANT CHANGE UP (ref 3.8–10.5)

## 2018-10-07 RX ORDER — DIPHENHYDRAMINE HCL 50 MG
25 CAPSULE ORAL ONCE
Qty: 0 | Refills: 0 | Status: COMPLETED | OUTPATIENT
Start: 2018-10-07 | End: 2018-10-07

## 2018-10-07 RX ORDER — ACETAMINOPHEN 500 MG
975 TABLET ORAL ONCE
Qty: 0 | Refills: 0 | Status: COMPLETED | OUTPATIENT
Start: 2018-10-07 | End: 2018-10-07

## 2018-10-07 RX ADMIN — OXYCODONE HYDROCHLORIDE 5 MILLIGRAM(S): 5 TABLET ORAL at 07:21

## 2018-10-07 RX ADMIN — OXYCODONE HYDROCHLORIDE 5 MILLIGRAM(S): 5 TABLET ORAL at 03:13

## 2018-10-07 RX ADMIN — Medication 100 MILLIGRAM(S): at 21:33

## 2018-10-07 RX ADMIN — Medication 500 MILLIGRAM(S): at 13:35

## 2018-10-07 RX ADMIN — Medication 600 MILLIGRAM(S): at 18:03

## 2018-10-07 RX ADMIN — Medication 600 MILLIGRAM(S): at 14:09

## 2018-10-07 RX ADMIN — Medication 100 MILLIGRAM(S): at 06:21

## 2018-10-07 RX ADMIN — PANTOPRAZOLE SODIUM 40 MILLIGRAM(S): 20 TABLET, DELAYED RELEASE ORAL at 06:21

## 2018-10-07 RX ADMIN — Medication 325 MILLIGRAM(S): at 21:33

## 2018-10-07 RX ADMIN — OXYCODONE HYDROCHLORIDE 5 MILLIGRAM(S): 5 TABLET ORAL at 06:20

## 2018-10-07 RX ADMIN — Medication 600 MILLIGRAM(S): at 13:35

## 2018-10-07 RX ADMIN — OXYCODONE HYDROCHLORIDE 5 MILLIGRAM(S): 5 TABLET ORAL at 10:57

## 2018-10-07 RX ADMIN — Medication 600 MILLIGRAM(S): at 00:23

## 2018-10-07 RX ADMIN — SIMETHICONE 80 MILLIGRAM(S): 80 TABLET, CHEWABLE ORAL at 21:33

## 2018-10-07 RX ADMIN — OXYCODONE HYDROCHLORIDE 5 MILLIGRAM(S): 5 TABLET ORAL at 18:03

## 2018-10-07 RX ADMIN — OXYCODONE HYDROCHLORIDE 5 MILLIGRAM(S): 5 TABLET ORAL at 11:30

## 2018-10-07 RX ADMIN — OXYCODONE HYDROCHLORIDE 5 MILLIGRAM(S): 5 TABLET ORAL at 02:13

## 2018-10-07 RX ADMIN — Medication 25 MILLIGRAM(S): at 15:09

## 2018-10-07 RX ADMIN — Medication 3 MILLILITER(S): at 06:21

## 2018-10-07 RX ADMIN — Medication 975 MILLIGRAM(S): at 15:09

## 2018-10-07 RX ADMIN — Medication 600 MILLIGRAM(S): at 23:20

## 2018-10-07 RX ADMIN — Medication 100 MILLIGRAM(S): at 18:03

## 2018-10-07 RX ADMIN — Medication 100 MILLIGRAM(S): at 13:36

## 2018-10-07 RX ADMIN — Medication 600 MILLIGRAM(S): at 07:21

## 2018-10-07 RX ADMIN — Medication 325 MILLIGRAM(S): at 06:21

## 2018-10-07 RX ADMIN — Medication 600 MILLIGRAM(S): at 23:50

## 2018-10-07 RX ADMIN — Medication 325 MILLIGRAM(S): at 13:35

## 2018-10-07 RX ADMIN — Medication 600 MILLIGRAM(S): at 06:21

## 2018-10-07 RX ADMIN — OXYCODONE HYDROCHLORIDE 5 MILLIGRAM(S): 5 TABLET ORAL at 22:02

## 2018-10-07 RX ADMIN — OXYCODONE HYDROCHLORIDE 5 MILLIGRAM(S): 5 TABLET ORAL at 21:32

## 2018-10-07 NOTE — PROGRESS NOTE ADULT - ATTENDING COMMENTS
Patient seen and examined by the bedside. Patient stable, denies chest pain, shortness of breath, dizziness or weakness on sitting. She does endorse dizziness on walking but did not get up to walk this morning yet. Small drop in Hct noted, VSS overall stable, however will obtain 10A CBC and address the possibility for a transfusion if the H/H drops further. She is otherwise meeting all postoperative milestones.    Plan discussed with Dr. Alistair Richards MD

## 2018-10-07 NOTE — PROGRESS NOTE ADULT - SUBJECTIVE AND OBJECTIVE BOX
GYN Progress Note, HD#4, POD#3     Patient seen and examined at bedside, no acute overnight events. No acute complaints, pain well controlled. Continues with nebulizers due to cold symptoms in setting of history of asthma w/ intermittent SOB. Endometrial lockhart remains in place w/ minimal vaginal bleeding.   Patient is ambulating, passing flatus, voiding spontaneously, and tolerating regular diet. Denies CP, N/V, fevers, and chills.    Vital Signs Last 24 Hours  T(C): 36.9 (10-07-18 @ 05:32), Max: 37 (10-06-18 @ 10:18)  HR: 90 (10-07-18 @ 05:32) (87 - 96)  BP: 119/77 (10-07-18 @ 05:32) (111/73 - 125/79)  RR: 19 (10-07-18 @ 05:32) (17 - 19)  SpO2: 98% (10-07-18 @ 05:32) (97% - 99%)    I&O's Summary    06 Oct 2018 07:01  -  07 Oct 2018 07:00  --------------------------------------------------------  IN: 720 mL / OUT: 1100 mL / NET: -380 mL        Physical Exam:  General: NAD  CV: NR, RR, S1, S2, no M/R/G  Lungs: CTA-B  Abdomen: Soft, non-tender, non-distended, +BS  Incision: CDI  Ext: No pain or swelling    Labs:             6.6<LL>  8.8   )-----------( 502<H>    ( 10-07 @ 05:54 )             22.6<L>               7.1<L>  10.6<H> )-----------( 498<H>    ( 10-06 @ 07:34 )             23.5<L>               7.5<L>  12.7<H> )-----------( 470<H>    ( 10-05 @ 13:55 )             24.1<L>               7.7<L>  12.8<H> )-----------( 485<H>    ( 10-05 @ 10:38 )             25.0<L>               7.4<L>  12.5<H> )-----------( 476<H>    ( 10-05 @ 07:14 )             24.5<L>        MEDICATIONS  (STANDING):  ALBUTerol/ipratropium for Nebulization 3 milliLiter(s) Nebulizer every 6 hours  ascorbic acid 500 milliGRAM(s) Oral daily  cefoTEtan  IVPB 2 Gram(s) IV Intermittent once  docusate sodium 100 milliGRAM(s) Oral three times a day  doxycycline hyclate Capsule 100 milliGRAM(s) Oral every 12 hours  ferrous    sulfate 325 milliGRAM(s) Oral three times a day  ibuprofen  Tablet. 600 milliGRAM(s) Oral every 6 hours  influenza   Vaccine 0.5 milliLiter(s) IntraMuscular once  oxyCODONE    IR 5 milliGRAM(s) Oral every 4 hours  pantoprazole    Tablet 40 milliGRAM(s) Oral before breakfast  simethicone 80 milliGRAM(s) Chew at bedtime    MEDICATIONS  (PRN):  acetaminophen   Tablet .. 650 milliGRAM(s) Oral every 6 hours PRN Mild Pain (1 - 3)  ALBUTerol    90 MICROgram(s) HFA Inhaler 2 Puff(s) Inhalation every 6 hours PRN Shortness of Breath and/or Wheezing

## 2018-10-07 NOTE — PROGRESS NOTE ADULT - PROBLEM SELECTOR PLAN 1
Neuro: c/w po pain meds  CV: Hemodynamically stable. AM CBC w/ Hb/Hct of 6.6/22.6, patient denies any s/s of anemia. Will discuss blood transfusion w/ team.   Pulm: Saturating well on room air (97-99%), encourage incentive spirometry. Continues w/ nebulizer treatment. Final CXR read pending, will follow results.   GI: c/w regular diet  : voiding spontaneously. Endometrial balloon was replaced yesterday after it fell out, remains in place currently w/ min vaginal bleeding.   Heme: c/w HSQ and SCDs for DVT ppx  Dispo: Continue routine post-op care.     KENYON Valadez, PGY-2

## 2018-10-07 NOTE — PROVIDER CONTACT NOTE (CRITICAL VALUE NOTIFICATION) - ACTION/TREATMENT ORDERED:
MD aware, will come to bedside to educate patient about possible transfusion. Have pt get oob to assess if she is symptomatic.
none.

## 2018-10-08 VITALS
RESPIRATION RATE: 17 BRPM | OXYGEN SATURATION: 98 % | TEMPERATURE: 98 F | DIASTOLIC BLOOD PRESSURE: 88 MMHG | HEART RATE: 79 BPM | SYSTOLIC BLOOD PRESSURE: 139 MMHG

## 2018-10-08 DIAGNOSIS — Z98.890 OTHER SPECIFIED POSTPROCEDURAL STATES: ICD-10-CM

## 2018-10-08 DIAGNOSIS — D64.9 ANEMIA, UNSPECIFIED: ICD-10-CM

## 2018-10-08 LAB
HCT VFR BLD CALC: 30.4 % — LOW (ref 34.5–45)
HGB BLD-MCNC: 9 G/DL — LOW (ref 11.5–15.5)
MCHC RBC-ENTMCNC: 22.9 PG — LOW (ref 27–34)
MCHC RBC-ENTMCNC: 29.5 GM/DL — LOW (ref 32–36)
MCV RBC AUTO: 77.7 FL — LOW (ref 80–100)
PLATELET # BLD AUTO: 541 K/UL — HIGH (ref 150–400)
RBC # BLD: 3.91 M/UL — SIGNIFICANT CHANGE UP (ref 3.8–5.2)
RBC # FLD: 18.7 % — HIGH (ref 10.3–14.5)
WBC # BLD: 9.1 K/UL — SIGNIFICANT CHANGE UP (ref 3.8–10.5)
WBC # FLD AUTO: 9.1 K/UL — SIGNIFICANT CHANGE UP (ref 3.8–10.5)

## 2018-10-08 PROCEDURE — P9016: CPT

## 2018-10-08 PROCEDURE — 81025 URINE PREGNANCY TEST: CPT

## 2018-10-08 PROCEDURE — C1765: CPT

## 2018-10-08 PROCEDURE — 88305 TISSUE EXAM BY PATHOLOGIST: CPT

## 2018-10-08 PROCEDURE — 86923 COMPATIBILITY TEST ELECTRIC: CPT

## 2018-10-08 PROCEDURE — C1889: CPT

## 2018-10-08 PROCEDURE — 85027 COMPLETE CBC AUTOMATED: CPT

## 2018-10-08 PROCEDURE — 80048 BASIC METABOLIC PNL TOTAL CA: CPT

## 2018-10-08 PROCEDURE — 36430 TRANSFUSION BLD/BLD COMPNT: CPT

## 2018-10-08 PROCEDURE — 71046 X-RAY EXAM CHEST 2 VIEWS: CPT

## 2018-10-08 PROCEDURE — 94640 AIRWAY INHALATION TREATMENT: CPT

## 2018-10-08 RX ORDER — ONDANSETRON 8 MG/1
4 TABLET, FILM COATED ORAL ONCE
Qty: 0 | Refills: 0 | Status: COMPLETED | OUTPATIENT
Start: 2018-10-08 | End: 2018-10-08

## 2018-10-08 RX ORDER — FERROUS SULFATE 325(65) MG
1 TABLET ORAL
Qty: 0 | Refills: 0 | COMMUNITY

## 2018-10-08 RX ADMIN — Medication 3 MILLILITER(S): at 14:02

## 2018-10-08 RX ADMIN — ONDANSETRON 4 MILLIGRAM(S): 8 TABLET, FILM COATED ORAL at 07:48

## 2018-10-08 RX ADMIN — OXYCODONE HYDROCHLORIDE 5 MILLIGRAM(S): 5 TABLET ORAL at 10:15

## 2018-10-08 RX ADMIN — Medication 500 MILLIGRAM(S): at 14:00

## 2018-10-08 RX ADMIN — Medication 600 MILLIGRAM(S): at 06:08

## 2018-10-08 RX ADMIN — OXYCODONE HYDROCHLORIDE 5 MILLIGRAM(S): 5 TABLET ORAL at 06:08

## 2018-10-08 RX ADMIN — Medication 3 MILLILITER(S): at 07:46

## 2018-10-08 RX ADMIN — OXYCODONE HYDROCHLORIDE 5 MILLIGRAM(S): 5 TABLET ORAL at 06:38

## 2018-10-08 RX ADMIN — Medication 325 MILLIGRAM(S): at 14:00

## 2018-10-08 RX ADMIN — Medication 100 MILLIGRAM(S): at 06:08

## 2018-10-08 RX ADMIN — Medication 600 MILLIGRAM(S): at 18:20

## 2018-10-08 RX ADMIN — OXYCODONE HYDROCHLORIDE 5 MILLIGRAM(S): 5 TABLET ORAL at 01:33

## 2018-10-08 RX ADMIN — Medication 600 MILLIGRAM(S): at 14:00

## 2018-10-08 RX ADMIN — Medication 600 MILLIGRAM(S): at 14:30

## 2018-10-08 RX ADMIN — Medication 100 MILLIGRAM(S): at 18:20

## 2018-10-08 RX ADMIN — PANTOPRAZOLE SODIUM 40 MILLIGRAM(S): 20 TABLET, DELAYED RELEASE ORAL at 06:09

## 2018-10-08 RX ADMIN — OXYCODONE HYDROCHLORIDE 5 MILLIGRAM(S): 5 TABLET ORAL at 14:30

## 2018-10-08 RX ADMIN — Medication 100 MILLIGRAM(S): at 14:01

## 2018-10-08 RX ADMIN — Medication 600 MILLIGRAM(S): at 06:38

## 2018-10-08 RX ADMIN — Medication 100 MILLIGRAM(S): at 06:09

## 2018-10-08 RX ADMIN — OXYCODONE HYDROCHLORIDE 5 MILLIGRAM(S): 5 TABLET ORAL at 14:03

## 2018-10-08 RX ADMIN — OXYCODONE HYDROCHLORIDE 5 MILLIGRAM(S): 5 TABLET ORAL at 02:03

## 2018-10-08 RX ADMIN — OXYCODONE HYDROCHLORIDE 5 MILLIGRAM(S): 5 TABLET ORAL at 18:20

## 2018-10-08 RX ADMIN — OXYCODONE HYDROCHLORIDE 5 MILLIGRAM(S): 5 TABLET ORAL at 09:42

## 2018-10-08 RX ADMIN — Medication 325 MILLIGRAM(S): at 06:08

## 2018-10-08 NOTE — PROGRESS NOTE ADULT - SUBJECTIVE AND OBJECTIVE BOX
JETT ORELLANA Progress Note POD#4    Patient seen and examined at bedside. No acute events overnight, completed blood transfusion without incident. Pain well controlled, minimal ambulation to bathroom, tolerating PO no N/V. Denies subjective fevers and chills. No further episodes of chest tightness. Minimal vaginal bleeding. +Flatus    Vital Signs Last 24 Hrs  T(C): 36.8 (08 Oct 2018 05:09), Max: 36.8 (07 Oct 2018 15:40)  T(F): 98.2 (08 Oct 2018 05:09), Max: 98.3 (07 Oct 2018 15:40)  HR: 82 (08 Oct 2018 05:09) (61 - 93)  BP: 143/84 (08 Oct 2018 05:09) (126/83 - 144/88)  RR: 17 (08 Oct 2018 05:09) (16 - 18)  SpO2: 100% (08 Oct 2018 05:09) (97% - 100%)    I&O's Summary    07 Oct 2018 07:01  -  08 Oct 2018 06:51  --------------------------------------------------------  IN: 1420 mL / OUT: 600 mL / NET: 820 mL    Gen: NAD  CV: RRR  Lungs: clear bilaterally  Abd: soft, non tender, non distended, abdominal binder in place  : minimal vaginal bleeding  Inc: c/d/i with steri strips  Ext: NTTP bilaterally, no edema      LABS:                        9.0    9.1   )-----------( 541      ( 08 Oct 2018 06:23 )             30.4     10-06    138  |  105  |  6<L>  ----------------------------<  92  3.6   |  25  |  0.95    Ca    8.3<L>      06 Oct 2018 07:33    MEDICATIONS  (STANDING):  ALBUTerol/ipratropium for Nebulization 3 milliLiter(s) Nebulizer every 6 hours  ascorbic acid 500 milliGRAM(s) Oral daily  cefoTEtan  IVPB 2 Gram(s) IV Intermittent once  docusate sodium 100 milliGRAM(s) Oral three times a day  doxycycline hyclate Capsule 100 milliGRAM(s) Oral every 12 hours  ferrous    sulfate 325 milliGRAM(s) Oral three times a day  ibuprofen  Tablet. 600 milliGRAM(s) Oral every 6 hours  influenza   Vaccine 0.5 milliLiter(s) IntraMuscular once  oxyCODONE    IR 5 milliGRAM(s) Oral every 4 hours  pantoprazole    Tablet 40 milliGRAM(s) Oral before breakfast  simethicone 80 milliGRAM(s) Chew at bedtime    MEDICATIONS  (PRN):  acetaminophen   Tablet .. 650 milliGRAM(s) Oral every 6 hours PRN Mild Pain (1 - 3)  ALBUTerol    90 MICROgram(s) HFA Inhaler 2 Puff(s) Inhalation every 6 hours PRN Shortness of Breath and/or Wheezing

## 2018-10-08 NOTE — PROGRESS NOTE ADULT - ASSESSMENT
A/P: 33 y/o F POD#4 s/p Ex-lap, abdominal myomectomy, post op course complicated by anemia, s/p 2uPRBC overnight  1. Neuro: Pain control with Tylenol, Motrin and Oxycodone   2. CV: hemodynamically stable, no active issues  3. Pulm: h/o asthma, continue albuterol and Duoneb PRN  4. GI: continue regular diet, mylicon, colace bowel regimen  5. : voiding spontaneously, uterine lockhart catheter in place   6. ID: Doxycyline BID while intrauterine lockhart in place for infection prophylaxis   7. Heme: Anemia s/p 2U prbc appropriate rise in H/H  8. Dispo: eval for d/c home this morning if patient no longer symptomatic of anemia    N Sample, PGY4

## 2018-10-08 NOTE — PROGRESS NOTE ADULT - ATTENDING COMMENTS
Pt seen and examined. I agree with above assessment and plan. Discharge planning once meets criteria. Pt seen and examined. I agree with above assessment and plan. Patient ambulating, tolerating PO and pain, and voiding freely. Plan for increased ambulation and discharge this afternoon.    Discussed the case with the patient and answered her questions regarding pain control with Tylenol, Motrin and Oxycodone. Discussed that she should practice pelvic rest for 6 weeks, this includes nothing in the vagina (no tampons, douching, sex, or tub baths). She was counseled to call the service line or return to the emergency room in the event of heavy vaginal bleeding (soaking more than one pad per hour for over 3 hours), fever, nausea, vomiting, shortness of breath, chest pain, dizziness, or weakness.    Sarah Richards MD

## 2018-10-12 LAB — SURGICAL PATHOLOGY STUDY: SIGNIFICANT CHANGE UP

## 2018-10-24 ENCOUNTER — APPOINTMENT (OUTPATIENT)
Dept: OBGYN | Facility: CLINIC | Age: 33
End: 2018-10-24
Payer: COMMERCIAL

## 2018-10-24 ENCOUNTER — TRANSCRIPTION ENCOUNTER (OUTPATIENT)
Age: 33
End: 2018-10-24

## 2018-10-24 PROBLEM — D25.9 LEIOMYOMA OF UTERUS, UNSPECIFIED: Chronic | Status: ACTIVE | Noted: 2018-09-20

## 2018-10-24 PROBLEM — K21.9 GASTRO-ESOPHAGEAL REFLUX DISEASE WITHOUT ESOPHAGITIS: Chronic | Status: ACTIVE | Noted: 2018-09-20

## 2018-10-24 PROBLEM — J45.909 UNSPECIFIED ASTHMA, UNCOMPLICATED: Chronic | Status: ACTIVE | Noted: 2018-09-20

## 2018-10-24 PROBLEM — E55.9 VITAMIN D DEFICIENCY, UNSPECIFIED: Chronic | Status: ACTIVE | Noted: 2018-09-20

## 2018-10-24 PROBLEM — G47.33 OBSTRUCTIVE SLEEP APNEA (ADULT) (PEDIATRIC): Chronic | Status: ACTIVE | Noted: 2018-09-20

## 2018-10-24 PROBLEM — D64.9 ANEMIA, UNSPECIFIED: Chronic | Status: ACTIVE | Noted: 2018-09-20

## 2018-10-24 PROCEDURE — 99024 POSTOP FOLLOW-UP VISIT: CPT

## 2018-11-21 ENCOUNTER — APPOINTMENT (OUTPATIENT)
Dept: OBGYN | Facility: CLINIC | Age: 33
End: 2018-11-21
Payer: COMMERCIAL

## 2018-11-21 PROCEDURE — 99024 POSTOP FOLLOW-UP VISIT: CPT

## 2019-10-02 ENCOUNTER — TRANSCRIPTION ENCOUNTER (OUTPATIENT)
Age: 34
End: 2019-10-02

## 2019-12-01 ENCOUNTER — RESULT REVIEW (OUTPATIENT)
Age: 34
End: 2019-12-01

## 2019-12-02 ENCOUNTER — APPOINTMENT (OUTPATIENT)
Dept: OBGYN | Facility: CLINIC | Age: 34
End: 2019-12-02
Payer: COMMERCIAL

## 2019-12-02 VITALS
BODY MASS INDEX: 38.98 KG/M2 | SYSTOLIC BLOOD PRESSURE: 132 MMHG | HEIGHT: 63 IN | DIASTOLIC BLOOD PRESSURE: 80 MMHG | WEIGHT: 220 LBS

## 2019-12-02 DIAGNOSIS — Z01.419 ENCOUNTER FOR GYNECOLOGICAL EXAMINATION (GENERAL) (ROUTINE) W/OUT ABNORMAL FINDINGS: ICD-10-CM

## 2019-12-02 DIAGNOSIS — N76.0 ACUTE VAGINITIS: ICD-10-CM

## 2019-12-02 DIAGNOSIS — Z86.018 PERSONAL HISTORY OF OTHER BENIGN NEOPLASM: ICD-10-CM

## 2019-12-02 PROCEDURE — 99395 PREV VISIT EST AGE 18-39: CPT

## 2019-12-02 NOTE — HISTORY OF PRESENT ILLNESS
[1 Year Ago] : 1 year ago [Fair] : being in fair health [Healthy Diet] : a healthy diet [Regular Exercise] : regular exercise [Last Pap ___] : Last cervical pap smear was [unfilled] [Reproductive Age] : is of reproductive age [Pregnancy History] : pregnancy history: [Sexually Active] : is sexually active [Up to Date] : up to date with ~his/her~ STD screening [Weight Concerns] : no concerns with her weight [Menstrual Problems] : reports normal menses

## 2019-12-02 NOTE — PHYSICAL EXAM
[Awake] : awake [Acute Distress] : no acute distress [Alert] : alert [LAD] : no lymphadenopathy [Goiter] : no goiter [Thyroid Nodule] : no thyroid nodule [Mass] : no breast mass [Nipple Discharge] : no nipple discharge [Axillary LAD] : no axillary lymphadenopathy [Soft] : soft [Tender] : non tender [Distended] : not distended [H/Smegaly] : no hepatosplenomegaly [Oriented x3] : oriented to person, place, and time [Depressed Mood] : not depressed [Flat Affect] : affect not flat [Normal] : uterus [No Bleeding] : there was no active vaginal bleeding [Anteversion] : anteverted [Tenderness] : nontender [Enlarged ___ wks] : enlarged [unfilled] ~Uweeks [Uterine Adnexae] : were not tender and not enlarged [RRR, No Murmurs] : RRR, no murmurs [CTAB] : CTAB

## 2019-12-02 NOTE — CHIEF COMPLAINT
[Annual Visit] : annual visit [FreeTextEntry1] : 34YO P1 LMP 11/5 menses still [id number] s/p open lap and myomectomies by Alistair 10/2018. Pt feels like she is becoming anemic again. She had a Hx of ringing in her right ear which improved after myomectomy but is still [present intermittently now. Also noting pain on lateral area of right breast. Trying to conceive X5-6 months.

## 2019-12-09 ENCOUNTER — MESSAGE (OUTPATIENT)
Age: 34
End: 2019-12-09

## 2019-12-09 RX ORDER — PANTOPRAZOLE 40 MG/1
40 TABLET, DELAYED RELEASE ORAL
Qty: 90 | Refills: 3 | Status: ACTIVE | COMMUNITY
Start: 1900-01-01 | End: 1900-01-01

## 2019-12-10 ENCOUNTER — OTHER (OUTPATIENT)
Age: 34
End: 2019-12-10

## 2019-12-11 ENCOUNTER — TRANSCRIPTION ENCOUNTER (OUTPATIENT)
Age: 34
End: 2019-12-11

## 2020-01-01 NOTE — PATIENT PROFILE ADULT. - CONTRAINDICATIONS & PRECAUTIONS (SELECT ALL THAT APPLY)
EMERGENCY DEPARTMENT HISTORY AND PHYSICAL EXAM      Date: 2020  Patient Name: Kimberly Dexter    History of Presenting Illness     Chief Complaint   Patient presents with    Cough       History Provided By: Patient's Mother    HPI: Kimberly Dexter, 2 m.o. female presents to the ED with her mother with complaints of cough and congestion x 2-3 days. Pt's mother also notes a decreased appetite, but denies fever, urinary changes or any other sx. The parent conveys that the patient is a full-term delivery, has no complications, is up-to-date on immunizations, takes no medications regularly, has no prior hospitalizations, and is reaching all of her developmental milestones. There are no other complaints, changes, or physical findings at this time. PCP: No primary care provider on file. No current facility-administered medications on file prior to encounter. No current outpatient medications on file prior to encounter. Past History     Past Medical History:  History reviewed. No pertinent past medical history. Past Surgical History:  History reviewed. No pertinent surgical history. Family History:  History reviewed. No pertinent family history. Social History:  Social History     Tobacco Use    Smoking status: Never Smoker    Smokeless tobacco: Never Used   Substance Use Topics    Alcohol use: Never     Frequency: Never    Drug use: Never       Allergies:  No Known Allergies      Review of Systems   Review of Systems   Constitutional: Positive for appetite change. Negative for activity change, decreased responsiveness and fever. HENT: Positive for congestion. Negative for ear discharge and trouble swallowing. Eyes: Negative for discharge and redness. Respiratory: Positive for cough. Negative for wheezing. Gastrointestinal: Negative for constipation, diarrhea and vomiting. Genitourinary:        Negative for acute changes. Skin: Negative for color change and rash. Physical Exam   Physical Exam  Vitals signs and nursing note reviewed. Constitutional:       General: She is awake, active, playful and smiling. She is not in acute distress. Appearance: Normal appearance. She is well-developed and normal weight. She is not ill-appearing, toxic-appearing or diaphoretic. HENT:      Head: Normocephalic and atraumatic. Nose: Nose normal.      Mouth/Throat:      Mouth: Mucous membranes are moist.      Pharynx: Oropharynx is clear. Eyes:      Extraocular Movements: Extraocular movements intact. Pupils: Pupils are equal, round, and reactive to light. Neck:      Musculoskeletal: Full passive range of motion without pain, normal range of motion and neck supple. Cardiovascular:      Rate and Rhythm: Normal rate and regular rhythm. Pulses: Normal pulses. Heart sounds: Normal heart sounds. Pulmonary:      Effort: Pulmonary effort is normal.      Breath sounds: Normal breath sounds. Abdominal:      General: Abdomen is flat. Bowel sounds are normal.      Palpations: Abdomen is soft. Musculoskeletal: Normal range of motion. Skin:     General: Skin is warm and dry. Capillary Refill: Capillary refill takes less than 2 seconds. Turgor: Normal.      Coloration: Skin is not cyanotic. Findings: No rash. Neurological:      General: No focal deficit present. Mental Status: She is alert. Motor: Motor function is intact.          Diagnostic Study Results     Labs -     Recent Results (from the past 12 hour(s))   RSV AG - RAPID    Collection Time: 12/04/20  6:01 PM   Result Value Ref Range    RSV Antigen Negative     INFLUENZA A & B AG (RAPID TEST)    Collection Time: 12/04/20  6:01 PM   Result Value Ref Range    Influenza A Antigen Negative Negative      Influenza B Antigen Negative Negative         Radiologic Studies -   XR CHEST PORT    (Results Pending)     CT Results  (Last 48 hours)    None        CXR Results  (Last 48 hours) None            Medical Decision Making   I am the first provider for this patient. I reviewed the vital signs, available nursing notes, past medical history, past surgical history, family history and social history. Vital Signs-Reviewed the patient's vital signs. Patient Vitals for the past 12 hrs:   Temp Pulse Resp SpO2   12/04/20 1755    100 %   12/04/20 1749 98.3 °F (36.8 °C) 142 20 100 %       Records Reviewed: Nursing Notes    The patient presents with cough and congestion with a differential diagnosis of RSV, influenza and pneumonia. ED Course:   Initial assessment performed. The patients presenting problems have been discussed, and they are in agreement with the care plan formulated and outlined with them. I have encouraged them to ask questions as they arise throughout their visit. Provider Notes (Medical Decision Making):     6104'  Patient is a 9week-old female who presents with cough. She is nontoxic-appearing vitals are stable. Chest x-ray was negative. RSV and flu were negative. Disposition       Discharged    PLAN:  1. There are no discharge medications for this patient. 2.   Follow-up Information     Follow up With Specialties Details Why Contact Info    Karla Montes De Oca MD Pediatric Medicine  As needed [id number] [facility]  [phone]          Return to ED if worse     Diagnosis     Clinical Impression:   1. Upper respiratory tract infection, unspecified type        By signing my name below, Blanca Wiley, attsurendra that this documentation has been prepared under the direction and in presence of Dr. Zahraa Nichols on 12/04/20.  Electronically signed: Ty Swenson, 12/04/20, 6:02 PM none...

## 2020-01-30 ENCOUNTER — OUTPATIENT (OUTPATIENT)
Dept: OUTPATIENT SERVICES | Facility: HOSPITAL | Age: 35
LOS: 1 days | Discharge: HOME | End: 2020-01-30

## 2020-01-30 DIAGNOSIS — Z98.890 OTHER SPECIFIED POSTPROCEDURAL STATES: Chronic | ICD-10-CM

## 2020-01-30 DIAGNOSIS — Z98.891 HISTORY OF UTERINE SCAR FROM PREVIOUS SURGERY: Chronic | ICD-10-CM

## 2020-01-31 DIAGNOSIS — F43.10 POST-TRAUMATIC STRESS DISORDER, UNSPECIFIED: ICD-10-CM

## 2020-01-31 DIAGNOSIS — F33.9 MAJOR DEPRESSIVE DISORDER, RECURRENT, UNSPECIFIED: ICD-10-CM

## 2020-02-13 ENCOUNTER — APPOINTMENT (OUTPATIENT)
Dept: PSYCHIATRY | Facility: CLINIC | Age: 35
End: 2020-02-13
Payer: COMMERCIAL

## 2020-02-13 PROCEDURE — 99205 OFFICE O/P NEW HI 60 MIN: CPT

## 2020-02-14 ENCOUNTER — APPOINTMENT (OUTPATIENT)
Dept: PSYCHIATRY | Facility: CLINIC | Age: 35
End: 2020-02-14
Payer: COMMERCIAL

## 2020-02-14 PROCEDURE — 90791 PSYCH DIAGNOSTIC EVALUATION: CPT

## 2020-02-18 ENCOUNTER — TRANSCRIPTION ENCOUNTER (OUTPATIENT)
Age: 35
End: 2020-02-18

## 2020-02-18 ENCOUNTER — APPOINTMENT (OUTPATIENT)
Dept: PSYCHIATRY | Facility: CLINIC | Age: 35
End: 2020-02-18
Payer: COMMERCIAL

## 2020-02-18 PROCEDURE — 90837 PSYTX W PT 60 MINUTES: CPT

## 2020-02-25 ENCOUNTER — APPOINTMENT (OUTPATIENT)
Dept: PSYCHIATRY | Facility: CLINIC | Age: 35
End: 2020-02-25
Payer: COMMERCIAL

## 2020-02-25 PROCEDURE — 90837 PSYTX W PT 60 MINUTES: CPT

## 2020-03-03 ENCOUNTER — APPOINTMENT (OUTPATIENT)
Dept: PSYCHIATRY | Facility: CLINIC | Age: 35
End: 2020-03-03

## 2020-03-06 ENCOUNTER — APPOINTMENT (OUTPATIENT)
Dept: PSYCHIATRY | Facility: CLINIC | Age: 35
End: 2020-03-06
Payer: COMMERCIAL

## 2020-03-06 PROCEDURE — 90837 PSYTX W PT 60 MINUTES: CPT

## 2020-03-12 ENCOUNTER — APPOINTMENT (OUTPATIENT)
Dept: PSYCHIATRY | Facility: CLINIC | Age: 35
End: 2020-03-12
Payer: COMMERCIAL

## 2020-03-12 PROCEDURE — 99213 OFFICE O/P EST LOW 20 MIN: CPT

## 2020-03-13 ENCOUNTER — APPOINTMENT (OUTPATIENT)
Dept: PSYCHIATRY | Facility: CLINIC | Age: 35
End: 2020-03-13
Payer: COMMERCIAL

## 2020-03-13 PROCEDURE — 90834 PSYTX W PT 45 MINUTES: CPT

## 2020-03-17 ENCOUNTER — APPOINTMENT (OUTPATIENT)
Dept: PSYCHIATRY | Facility: CLINIC | Age: 35
End: 2020-03-17
Payer: COMMERCIAL

## 2020-03-17 PROCEDURE — 99443: CPT

## 2020-03-24 ENCOUNTER — APPOINTMENT (OUTPATIENT)
Dept: PSYCHIATRY | Facility: CLINIC | Age: 35
End: 2020-03-24

## 2020-03-26 ENCOUNTER — APPOINTMENT (OUTPATIENT)
Dept: PSYCHIATRY | Facility: CLINIC | Age: 35
End: 2020-03-26
Payer: COMMERCIAL

## 2020-03-26 PROCEDURE — 98968 PH1 ASSMT&MGMT NQHP 21-30: CPT

## 2020-04-07 ENCOUNTER — APPOINTMENT (OUTPATIENT)
Dept: PSYCHIATRY | Facility: CLINIC | Age: 35
End: 2020-04-07
Payer: COMMERCIAL

## 2020-04-07 PROCEDURE — 90834 PSYTX W PT 45 MINUTES: CPT | Mod: 95

## 2020-04-07 PROCEDURE — 99214 OFFICE O/P EST MOD 30 MIN: CPT

## 2020-04-16 ENCOUNTER — APPOINTMENT (OUTPATIENT)
Dept: PSYCHIATRY | Facility: CLINIC | Age: 35
End: 2020-04-16
Payer: COMMERCIAL

## 2020-04-16 PROCEDURE — 90834 PSYTX W PT 45 MINUTES: CPT | Mod: 95

## 2020-04-23 ENCOUNTER — APPOINTMENT (OUTPATIENT)
Dept: PSYCHIATRY | Facility: CLINIC | Age: 35
End: 2020-04-23
Payer: COMMERCIAL

## 2020-04-23 PROCEDURE — 90834 PSYTX W PT 45 MINUTES: CPT | Mod: 95

## 2020-04-30 ENCOUNTER — APPOINTMENT (OUTPATIENT)
Dept: PSYCHIATRY | Facility: CLINIC | Age: 35
End: 2020-04-30

## 2020-04-30 ENCOUNTER — APPOINTMENT (OUTPATIENT)
Dept: PSYCHIATRY | Facility: CLINIC | Age: 35
End: 2020-04-30
Payer: COMMERCIAL

## 2020-04-30 PROCEDURE — 90834 PSYTX W PT 45 MINUTES: CPT | Mod: 95

## 2020-05-05 ENCOUNTER — APPOINTMENT (OUTPATIENT)
Dept: PSYCHIATRY | Facility: CLINIC | Age: 35
End: 2020-05-05
Payer: COMMERCIAL

## 2020-05-05 PROCEDURE — 99214 OFFICE O/P EST MOD 30 MIN: CPT | Mod: 95

## 2020-05-07 ENCOUNTER — APPOINTMENT (OUTPATIENT)
Dept: PSYCHIATRY | Facility: CLINIC | Age: 35
End: 2020-05-07
Payer: COMMERCIAL

## 2020-05-07 PROCEDURE — 90837 PSYTX W PT 60 MINUTES: CPT | Mod: 95

## 2020-05-14 ENCOUNTER — APPOINTMENT (OUTPATIENT)
Dept: PSYCHIATRY | Facility: CLINIC | Age: 35
End: 2020-05-14
Payer: COMMERCIAL

## 2020-05-14 PROCEDURE — 90834 PSYTX W PT 45 MINUTES: CPT | Mod: 95

## 2020-05-21 ENCOUNTER — APPOINTMENT (OUTPATIENT)
Dept: PSYCHIATRY | Facility: CLINIC | Age: 35
End: 2020-05-21
Payer: COMMERCIAL

## 2020-05-21 PROCEDURE — 90834 PSYTX W PT 45 MINUTES: CPT | Mod: 95

## 2020-05-28 ENCOUNTER — APPOINTMENT (OUTPATIENT)
Dept: PSYCHIATRY | Facility: CLINIC | Age: 35
End: 2020-05-28
Payer: COMMERCIAL

## 2020-05-28 PROCEDURE — 99214 OFFICE O/P EST MOD 30 MIN: CPT | Mod: 95

## 2020-05-28 PROCEDURE — 90834 PSYTX W PT 45 MINUTES: CPT | Mod: 95

## 2020-06-04 ENCOUNTER — APPOINTMENT (OUTPATIENT)
Dept: PSYCHIATRY | Facility: CLINIC | Age: 35
End: 2020-06-04
Payer: COMMERCIAL

## 2020-06-04 PROCEDURE — 90834 PSYTX W PT 45 MINUTES: CPT | Mod: 95

## 2020-06-11 ENCOUNTER — APPOINTMENT (OUTPATIENT)
Dept: PSYCHIATRY | Facility: CLINIC | Age: 35
End: 2020-06-11
Payer: COMMERCIAL

## 2020-06-11 PROCEDURE — 90834 PSYTX W PT 45 MINUTES: CPT | Mod: 95

## 2020-06-19 ENCOUNTER — APPOINTMENT (OUTPATIENT)
Dept: PSYCHIATRY | Facility: CLINIC | Age: 35
End: 2020-06-19
Payer: COMMERCIAL

## 2020-06-19 PROCEDURE — 90834 PSYTX W PT 45 MINUTES: CPT | Mod: 95

## 2020-07-02 ENCOUNTER — APPOINTMENT (OUTPATIENT)
Dept: PSYCHIATRY | Facility: CLINIC | Age: 35
End: 2020-07-02
Payer: COMMERCIAL

## 2020-07-02 PROCEDURE — 90834 PSYTX W PT 45 MINUTES: CPT

## 2020-07-09 ENCOUNTER — APPOINTMENT (OUTPATIENT)
Dept: PSYCHIATRY | Facility: CLINIC | Age: 35
End: 2020-07-09
Payer: COMMERCIAL

## 2020-07-09 PROCEDURE — 90834 PSYTX W PT 45 MINUTES: CPT

## 2020-07-16 ENCOUNTER — APPOINTMENT (OUTPATIENT)
Dept: PSYCHIATRY | Facility: CLINIC | Age: 35
End: 2020-07-16
Payer: COMMERCIAL

## 2020-07-16 PROCEDURE — 90834 PSYTX W PT 45 MINUTES: CPT | Mod: 95

## 2020-07-23 ENCOUNTER — APPOINTMENT (OUTPATIENT)
Dept: PSYCHIATRY | Facility: CLINIC | Age: 35
End: 2020-07-23
Payer: COMMERCIAL

## 2020-07-23 PROCEDURE — 90834 PSYTX W PT 45 MINUTES: CPT | Mod: 95

## 2020-07-30 ENCOUNTER — APPOINTMENT (OUTPATIENT)
Dept: PSYCHIATRY | Facility: CLINIC | Age: 35
End: 2020-07-30
Payer: COMMERCIAL

## 2020-07-30 PROCEDURE — 90837 PSYTX W PT 60 MINUTES: CPT | Mod: 95

## 2020-08-05 ENCOUNTER — APPOINTMENT (OUTPATIENT)
Dept: PSYCHIATRY | Facility: CLINIC | Age: 35
End: 2020-08-05
Payer: COMMERCIAL

## 2020-08-05 PROCEDURE — 90834 PSYTX W PT 45 MINUTES: CPT | Mod: 95

## 2020-08-14 ENCOUNTER — APPOINTMENT (OUTPATIENT)
Dept: PSYCHIATRY | Facility: CLINIC | Age: 35
End: 2020-08-14
Payer: COMMERCIAL

## 2020-08-14 PROCEDURE — 90837 PSYTX W PT 60 MINUTES: CPT

## 2020-08-21 ENCOUNTER — APPOINTMENT (OUTPATIENT)
Dept: PSYCHIATRY | Facility: CLINIC | Age: 35
End: 2020-08-21
Payer: COMMERCIAL

## 2020-08-21 PROCEDURE — 90837 PSYTX W PT 60 MINUTES: CPT | Mod: 95

## 2020-08-26 ENCOUNTER — APPOINTMENT (OUTPATIENT)
Dept: PSYCHIATRY | Facility: CLINIC | Age: 35
End: 2020-08-26
Payer: COMMERCIAL

## 2020-08-26 PROCEDURE — 90837 PSYTX W PT 60 MINUTES: CPT

## 2020-08-29 ENCOUNTER — TRANSCRIPTION ENCOUNTER (OUTPATIENT)
Age: 35
End: 2020-08-29

## 2020-09-02 ENCOUNTER — APPOINTMENT (OUTPATIENT)
Dept: PSYCHIATRY | Facility: CLINIC | Age: 35
End: 2020-09-02
Payer: COMMERCIAL

## 2020-09-02 PROCEDURE — 90834 PSYTX W PT 45 MINUTES: CPT

## 2020-09-14 ENCOUNTER — APPOINTMENT (OUTPATIENT)
Dept: PSYCHIATRY | Facility: CLINIC | Age: 35
End: 2020-09-14
Payer: COMMERCIAL

## 2020-09-14 ENCOUNTER — APPOINTMENT (OUTPATIENT)
Dept: PSYCHIATRY | Facility: CLINIC | Age: 35
End: 2020-09-14

## 2020-09-14 PROCEDURE — 90837 PSYTX W PT 60 MINUTES: CPT

## 2020-09-22 ENCOUNTER — APPOINTMENT (OUTPATIENT)
Dept: PSYCHIATRY | Facility: CLINIC | Age: 35
End: 2020-09-22
Payer: COMMERCIAL

## 2020-09-22 PROCEDURE — 90834 PSYTX W PT 45 MINUTES: CPT

## 2020-09-22 NOTE — H&P PST ADULT - EXTREMITIES
Discussed with the patient the importance of good control of their blood sugar, blood pressure, cholesterol, diet, exercise, weight, and medication usage under the guidance of their diabetic doctor to prevent/halt diabetic retinopathy. detailed exam

## 2020-09-30 ENCOUNTER — APPOINTMENT (OUTPATIENT)
Dept: PSYCHIATRY | Facility: CLINIC | Age: 35
End: 2020-09-30
Payer: COMMERCIAL

## 2020-09-30 PROCEDURE — 90847 FAMILY PSYTX W/PT 50 MIN: CPT

## 2020-09-30 PROCEDURE — 99214 OFFICE O/P EST MOD 30 MIN: CPT

## 2020-10-07 ENCOUNTER — APPOINTMENT (OUTPATIENT)
Dept: PSYCHIATRY | Facility: CLINIC | Age: 35
End: 2020-10-07

## 2020-10-14 ENCOUNTER — APPOINTMENT (OUTPATIENT)
Dept: PSYCHIATRY | Facility: CLINIC | Age: 35
End: 2020-10-14
Payer: COMMERCIAL

## 2020-10-14 PROCEDURE — 90834 PSYTX W PT 45 MINUTES: CPT

## 2020-10-21 ENCOUNTER — APPOINTMENT (OUTPATIENT)
Dept: PSYCHIATRY | Facility: CLINIC | Age: 35
End: 2020-10-21
Payer: COMMERCIAL

## 2020-10-21 PROCEDURE — 90834 PSYTX W PT 45 MINUTES: CPT

## 2020-10-28 ENCOUNTER — APPOINTMENT (OUTPATIENT)
Dept: PSYCHIATRY | Facility: CLINIC | Age: 35
End: 2020-10-28
Payer: COMMERCIAL

## 2020-10-28 PROCEDURE — 90834 PSYTX W PT 45 MINUTES: CPT

## 2020-10-28 PROCEDURE — 99072 ADDL SUPL MATRL&STAF TM PHE: CPT

## 2020-11-11 ENCOUNTER — APPOINTMENT (OUTPATIENT)
Dept: PSYCHIATRY | Facility: CLINIC | Age: 35
End: 2020-11-11
Payer: COMMERCIAL

## 2020-11-11 PROCEDURE — 90834 PSYTX W PT 45 MINUTES: CPT

## 2020-11-11 PROCEDURE — 99072 ADDL SUPL MATRL&STAF TM PHE: CPT

## 2020-11-18 ENCOUNTER — APPOINTMENT (OUTPATIENT)
Dept: PSYCHIATRY | Facility: CLINIC | Age: 35
End: 2020-11-18

## 2020-11-19 ENCOUNTER — APPOINTMENT (OUTPATIENT)
Dept: PSYCHIATRY | Facility: CLINIC | Age: 35
End: 2020-11-19
Payer: COMMERCIAL

## 2020-11-19 PROCEDURE — 90834 PSYTX W PT 45 MINUTES: CPT

## 2020-11-25 ENCOUNTER — APPOINTMENT (OUTPATIENT)
Dept: PSYCHIATRY | Facility: CLINIC | Age: 35
End: 2020-11-25
Payer: COMMERCIAL

## 2020-11-25 PROCEDURE — 90837 PSYTX W PT 60 MINUTES: CPT

## 2020-12-01 ENCOUNTER — APPOINTMENT (OUTPATIENT)
Dept: PSYCHIATRY | Facility: CLINIC | Age: 35
End: 2020-12-01

## 2020-12-02 ENCOUNTER — APPOINTMENT (OUTPATIENT)
Dept: PSYCHIATRY | Facility: CLINIC | Age: 35
End: 2020-12-02
Payer: COMMERCIAL

## 2020-12-02 PROCEDURE — 99072 ADDL SUPL MATRL&STAF TM PHE: CPT

## 2020-12-02 PROCEDURE — 90834 PSYTX W PT 45 MINUTES: CPT

## 2020-12-09 ENCOUNTER — APPOINTMENT (OUTPATIENT)
Dept: PSYCHIATRY | Facility: CLINIC | Age: 35
End: 2020-12-09
Payer: COMMERCIAL

## 2020-12-09 PROCEDURE — 90837 PSYTX W PT 60 MINUTES: CPT | Mod: 95

## 2020-12-16 ENCOUNTER — APPOINTMENT (OUTPATIENT)
Dept: PSYCHIATRY | Facility: CLINIC | Age: 35
End: 2020-12-16

## 2020-12-21 PROBLEM — Z01.419 ENCOUNTER FOR ANNUAL ROUTINE GYNECOLOGICAL EXAMINATION: Status: RESOLVED | Noted: 2017-07-17 | Resolved: 2020-12-21

## 2020-12-28 ENCOUNTER — APPOINTMENT (OUTPATIENT)
Dept: PSYCHIATRY | Facility: CLINIC | Age: 35
End: 2020-12-28

## 2021-09-14 ENCOUNTER — APPOINTMENT (OUTPATIENT)
Dept: PSYCHIATRY | Facility: CLINIC | Age: 36
End: 2021-09-14
Payer: SELF-PAY

## 2021-09-14 PROCEDURE — 90837 PSYTX W PT 60 MINUTES: CPT

## 2021-09-14 PROCEDURE — 99214 OFFICE O/P EST MOD 30 MIN: CPT

## 2021-09-24 ENCOUNTER — APPOINTMENT (OUTPATIENT)
Dept: PSYCHIATRY | Facility: CLINIC | Age: 36
End: 2021-09-24
Payer: SELF-PAY

## 2021-09-24 PROCEDURE — 90834 PSYTX W PT 45 MINUTES: CPT

## 2021-09-29 ENCOUNTER — APPOINTMENT (OUTPATIENT)
Dept: PSYCHIATRY | Facility: CLINIC | Age: 36
End: 2021-09-29
Payer: COMMERCIAL

## 2021-09-29 PROCEDURE — 90834 PSYTX W PT 45 MINUTES: CPT

## 2021-10-06 ENCOUNTER — APPOINTMENT (OUTPATIENT)
Dept: PSYCHIATRY | Facility: CLINIC | Age: 36
End: 2021-10-06
Payer: COMMERCIAL

## 2021-10-06 PROCEDURE — 90834 PSYTX W PT 45 MINUTES: CPT

## 2021-10-12 ENCOUNTER — APPOINTMENT (OUTPATIENT)
Dept: PSYCHIATRY | Facility: CLINIC | Age: 36
End: 2021-10-12
Payer: COMMERCIAL

## 2021-10-12 PROCEDURE — 90837 PSYTX W PT 60 MINUTES: CPT

## 2021-10-13 ENCOUNTER — NON-APPOINTMENT (OUTPATIENT)
Age: 36
End: 2021-10-13

## 2021-10-13 NOTE — DISCHARGE NOTE ADULT - CONDITIONS AT DISCHARGE
ambulatory
pt A&O x's 4,  vitals stable   ambulating independently , tolerating diet,  pain controlled well by oral meds and voiding adequate urine  lower transverse incision closed with steri strips well approximated  cdi mandie  ivl dc'd  no s/s of infection or infiltration

## 2021-10-20 ENCOUNTER — APPOINTMENT (OUTPATIENT)
Dept: PSYCHIATRY | Facility: CLINIC | Age: 36
End: 2021-10-20
Payer: COMMERCIAL

## 2021-10-20 PROCEDURE — 90834 PSYTX W PT 45 MINUTES: CPT

## 2021-10-20 PROCEDURE — 99214 OFFICE O/P EST MOD 30 MIN: CPT

## 2021-10-27 ENCOUNTER — APPOINTMENT (OUTPATIENT)
Dept: PSYCHIATRY | Facility: CLINIC | Age: 36
End: 2021-10-27

## 2021-11-03 ENCOUNTER — APPOINTMENT (OUTPATIENT)
Dept: PSYCHIATRY | Facility: CLINIC | Age: 36
End: 2021-11-03
Payer: COMMERCIAL

## 2021-11-03 PROCEDURE — 90834 PSYTX W PT 45 MINUTES: CPT

## 2021-11-10 ENCOUNTER — APPOINTMENT (OUTPATIENT)
Dept: PSYCHIATRY | Facility: CLINIC | Age: 36
End: 2021-11-10
Payer: COMMERCIAL

## 2021-11-10 PROCEDURE — 90834 PSYTX W PT 45 MINUTES: CPT

## 2021-11-17 ENCOUNTER — APPOINTMENT (OUTPATIENT)
Dept: PSYCHIATRY | Facility: CLINIC | Age: 36
End: 2021-11-17
Payer: COMMERCIAL

## 2021-11-17 DIAGNOSIS — F42.8 OTHER OBSESSIVE COMPULSIVE DISORDER: ICD-10-CM

## 2021-11-17 PROCEDURE — 99214 OFFICE O/P EST MOD 30 MIN: CPT | Mod: 95

## 2021-11-17 PROCEDURE — 90837 PSYTX W PT 60 MINUTES: CPT | Mod: 95

## 2021-11-17 RX ORDER — VENLAFAXINE HYDROCHLORIDE 150 MG/1
150 TABLET, EXTENDED RELEASE ORAL
Qty: 30 | Refills: 2 | Status: DISCONTINUED | COMMUNITY
Start: 2020-09-30 | End: 2021-11-17

## 2021-12-01 ENCOUNTER — APPOINTMENT (OUTPATIENT)
Dept: PSYCHIATRY | Facility: CLINIC | Age: 36
End: 2021-12-01
Payer: COMMERCIAL

## 2021-12-01 PROCEDURE — 90834 PSYTX W PT 45 MINUTES: CPT | Mod: 95

## 2021-12-08 ENCOUNTER — APPOINTMENT (OUTPATIENT)
Dept: PSYCHIATRY | Facility: CLINIC | Age: 36
End: 2021-12-08
Payer: COMMERCIAL

## 2021-12-08 PROCEDURE — 90847 FAMILY PSYTX W/PT 50 MIN: CPT

## 2021-12-15 ENCOUNTER — APPOINTMENT (OUTPATIENT)
Dept: PSYCHIATRY | Facility: CLINIC | Age: 36
End: 2021-12-15

## 2021-12-15 NOTE — PATIENT PROFILE ADULT. - TEACHING/LEARNING LEARNING PREFERENCES
[facility] ([address] [place] [address] Medicine  Progress Note    Patient Name: Domonique Hernandez Jr.  MRN: [id number]  Patient Class: IP- Inpatient   Admission Date: 12/7/2021  Length of Stay: 8 days  Attending Physician: Emiliano Cam MD  Primary Care Provider: Emiliano Cam MD        Subjective:     Principal Problem:SBO (small bowel obstruction)        HPI:  80yo male patient with hx of anxiety, arthritis, back pain, bronchitis/COPD, depression, GERD, HLD, hypothyroid, PVD, sleep apnea on CPAP, and CKD. Pt came to ER with c/o regurgitation for 3 days. Decrease intake. No fever. No UTI s/s. CT on admission shows SBO. Significantly dehydrated. Creat 3.6-- baseline 1.4. Given 1L NS in ER U/a dirty one dose of zosyn given. No fever. No elevated WBC. Lactate 2.3. He had NGT place and put to suction. He is in bed this am. Reports feeling better. General surgery consulted.       Overview/Hospital Course:  12/8 Pt was admitted yesterday with SBO. He had general surgery consulted. NGT placed to suction. Still no flatus or BM. KUB pending for this am.     Also on rocephin for UTI-culture in process. Was given 1L NS in ER and repeated on floor then started NS at 150ml/hr. Creat on admit 3.6> now 3.0-- baseline creat 1.4. lactate did impropve on repeat.  Renal US shows cysts.  No hydronephrosis.     12/9 pt here with SBO. NGT to suction had 1000ml overnight. No flatus. No BM. +BS  Renal function improved 3.6>>2.1 on NS at 150ml/hr. Rocephin for UTI- urine culture shows multiple organisms. NO fever. NO elevated WBC    12/10/21    80YO WM admitted with SBO 4 days ago, NGT pulled yesterday, tolerating liquids,   Burping, t  KUB this am- NG tube removed, otherwise no significant change when compared with the prior study of December 9, 2021.     Creat much improved 3.6>1.6 ,  IV Rocephin day 4 for UTI, initial urine Cx- multiple organisms, repaeat U/A dirty, culture in process   7 months ago urine CX+ MRSA- sensitve to  bactrim, tetracycline and Vanc ; will add oral doxy   WBC normal, afebrile   Per PT yesterday; Gait: Contact Gaurd Assistance x ~30 feet (steeping forward and back due to limited line of medical equipment - NG tube) using RW.    12/11/21  78YO WM admitted with SBO  NG was pulled 12/9   But yesterday started with nausea and later with vomiting  and NG has to be placed back again   KUB is pending .  On doxy for UTI recurrent ; last culture staph mrsa sensitive to tetracyclines     12/12  S/p surgery yesterday for SBO .  Reports no flatus . No BM   Issues with bleeding from incision   Lab Results   Component Value Date    WBC 9.47 12/12/2021    HGB 11.3 (L) 12/12/2021    HCT 37.2 (L) 12/12/2021    MCV 96 12/12/2021     12/12/2021 12/13 pt was taken to OR Saturday. He had NGT replaced after that. This am he is feeling better. Dr Hein saw him this am from general surgery and rec pulling NG and starting clears. He is not tender to palp this am. Passed gas this am.   H/H 9/30- lovenox was held due to ozzing after surgery- hx of a fib  Urine culture shows candida     12/14/21  NGT pulled yesterday, pt tolerating liquid diet, Na 147, creat 1.2, + CKD ; getting NS @150ml/hr; decrease to 75ml/hr   H&H 9.1/29 from 9.6/30   Had a BM yesterday, will continue oral liquids until bowel sounds more active   WBC normal, afebrile, O2 sat stable on RA   Urine Cx with candida, doxy stopped after 3 days ; will start diflucan 100mg daily x 3d .    12/15 Pt admitted  with SBO - NGT pulled 2 days ago, tolerating liquids, passing flatus, had BM this am   Surgical incision evaluated per general sx yesterday, recommend advance diet and ^OOB   Contact Guard Assistance x ~30 feet with RW. Slow phase, slight dec foot/floor clearance and guarding stomach  VSS, afebrile, WBC 11.53   Day 2/3 of diflucan for candida in urine   Getting IV labetolol; transition to oral home metoprolol   Awaiting on state form 142 to approve nursing home  admission due to psychiatric hx of anxiety/depression         Review of Systems   Constitutional: Positive for fatigue. Negative for activity change, fever and unexpected weight change.   HENT: Negative for congestion and sore throat.    Eyes: Negative for visual disturbance.   Respiratory: Negative for cough and shortness of breath.    Cardiovascular: Negative for chest pain.   Gastrointestinal: Negative for abdominal pain, blood in stool, constipation and diarrhea.   Genitourinary: Negative for dysuria and hematuria.   Neurological: Negative for dizziness and light-headedness.     Objective:     Vital Signs (Most Recent):  Temp: 97.8 °F (36.6 °C) (12/15/21 0714)  Pulse: 82 (12/15/21 0714)  Resp: 12 (12/15/21 0714)  BP: (!) 141/76 (12/15/21 0714)  SpO2: 99 % (12/15/21 0751) Vital Signs (24h Range):  Temp:  [96.5 °F (35.8 °C)-98.7 °F (37.1 °C)] 97.8 °F (36.6 °C)  Pulse:  [58-94] 82  Resp:  [12-20] 12  SpO2:  [97 %-99 %] 99 %  BP: (116-146)/(61-88) 141/76     Weight: 100 kg (220 lb 7.4 oz)  Body mass index is 33.52 kg/m².    Intake/Output Summary (Last 24 hours) at 12/15/2021 0819  Last data filed at 12/15/2021 0600  Gross per 24 hour   Intake 1585 ml   Output 400 ml   Net 1185 ml      Physical Exam  Vitals and nursing note reviewed.   Constitutional:       General: He is not in acute distress.     Appearance: He is well-developed.   HENT:      Head: Normocephalic and atraumatic.      Right Ear: External ear normal.      Left Ear: External ear normal.      Nose:      Comments: Pressure sore on right nare from NGT  Eyes:      General:         Right eye: No discharge.         Left eye: No discharge.      Conjunctiva/sclera: Conjunctivae normal.   Neck:      Thyroid: No thyromegaly.   Cardiovascular:      Rate and Rhythm: Normal rate and regular rhythm.      Heart sounds: Normal heart sounds. No murmur heard.      Pulmonary:      Effort: Pulmonary effort is normal. No respiratory distress.      Breath sounds: Normal  breath sounds.   Abdominal:      General: Abdomen is flat. Bowel sounds are normal. There is no distension.      Palpations: Abdomen is soft.      Tenderness: There is abdominal tenderness (around incision).      Comments: Appropriately tender   Dressing c/d/i   Musculoskeletal:      Cervical back: Neck supple.      Right lower leg: No edema.      Left lower leg: No edema.   Skin:     General: Skin is warm and dry.   Neurological:      General: No focal deficit present.      Mental Status: He is alert. Mental status is at baseline.   Psychiatric:         Behavior: Behavior normal.         Thought Content: Thought content normal.         Significant Labs:   .  CBC:   Recent Labs   Lab 21  0556 12/15/21  0552   WBC 9.62 11.53   HGB 9.1* 9.9*   HCT 29.1* 31.3*    196     CMP:   No results for input(s): NA, K, CL, CO2, GLU, BUN, CREATININE, CALCIUM, PROT, ALBUMIN, BILITOT, ALKPHOS, AST, ALT, ANIONGAP, EGFRNONAA in the last 48 hours.    Invalid input(s): ESTGFAFRICA  lactic acid 2.7>1.9  Lipase 27  urine culture multiple organisms   covid screen negative   Significant Imagin/11 KUB NG tube in position.  Prior midline abdominal wall hernia repair.  Otherwise negative abdomen x-ray    12/10 KUB NG tube again noted in place.  No significant change in the bowel gas pattern when compared with the prior study.     US renal Normal size bilateral kidneys each containing a couple of benign anechoic cyst identified the midpole and upper pole regions largest single index cyst about the lateral cortex of the right kidney measuring 2.3 x 2.1 x 2.2 cm in size.     KUB  Enteric tube terminates in the gastric body.  The degree of distention of the small bowel loops is decreased as compared to prior examinations suggesting a resolving/revolved small-bowel obstruction.  No free air is identified.  Age-appropriate degenerative changes affect the skeleton.     CT abd and pelvis Imaging findings in keeping with a  high-grade small bowel obstruction with a point of transition seen in the right mid abdomen, likely related to adhesions.  There is significant inflammatory changes about the dilated small bowel loops in the mid abdomen likely related to engorgement of the Vasa recta.  Additionally, there is a small amount of free fluid about the liver and extending into the pelvic cul-de-sac, with slightly high density of the fluid in the pelvic cul-de-sac suggesting hyperemia issues content fluid.     KUB The enteric tube has been appropriately retracted and now terminates in the region of the distal gastric body.Slightly prominent loops of small bowel seen in the upper abdomen.  No definite free peritoneal air is seen.     Lung parenchyma cannot be adequately evaluated secondary to overexposure of the central aspect of the lungs.Consider dedicated radiograph of the chest evaluate for parenchymal injury.     CXR Enteric tube terminates in the gastric body.  There is bibasilar subsegmental atelectasis, with right basilar scarring.  No focal airspace consolidation or pleural effusions.  No pneumothorax.  The heart is enlarged.  Calcified atheromatous disease affects the aorta.  Median sternotomy wires are midline.  Age-appropriate degenerative changes affect the skeleton.         Assessment/Plan:      * SBO (small bowel obstruction)  NPO  NGT to wall suction  KUB daily  CXR nothing acute  IVF started NS at 150ml/hr    He meets SIRS criteria but I think better explained by SBO presentation that infection.  UA > 100 WBC but he has no urinary tract symptoms. He has recurrent UTI and will start Rocephin now but my clinical impression is the abnormal vitals and elevated lactic acid are due to SBO, dehydration and not UTI sepsis    12/10 tolerating orals, advance diet .    12/11  Placed NG back again   KUB pending     12/12  S/p surgery ; POD 1   NG to suction   Bleeding stopped    12/13   NG pulled from general surgery will try clears  this am. + flatus.    12/14 tolerating orals . No nausea ; no vomiting     Evaluation by psychiatric service required  Reviewed  consult .      Paroxysmal atrial fibrillation  Noted in Dr Fair notes:    On eliquis and BB at home. NPO here. Will give lovenox- renally dose and labetalol IV for now   12/10 tolerating liquids, advance diet and resume oral BB, stop IV labetolol,   Resume eliquis;      12/11  DC eliquis   Resume lovenox .    12/12  HOLD LOVENOX   HB dropped 2 points   Follow CBC   Resume lovenox tomorrow     12/14 resume lovenox and watch for bleeding ;   12/15 transition back  to ELiquis 2.5mg bid  , getting labetolol IV q 6 ;t akes metoprolol XL 50mg po daily- resume      Acute renal failure  Baseline creat 1.3-1.4- on admission 3.6. 1L NS given in ER. Repeat 1L Ns bolus 12/7 and then cont NS at 150ml/hr. Repeat lactate better 1.9  Creat now 3.0  Son reports that he has no issues urinating at home  Will check renal U/s and post void residual  Has had urinary re tension in past. Consider salcedo if retaining due ti UTI    Repeat u.a today cont rocephin     12/10 creat improved - 3.6>1.6; renal US stable     12/11  [facility]  Lab Results   Component Value Date     (H) 12/13/2021    K 3.9 12/13/2021     (H) 12/13/2021    CO2 12 (L) 12/13/2021    BUN 27 (H) 12/13/2021    CREATININE 1.2 12/13/2021    CALCIUM 8.2 (L) 12/13/2021    ANIONGAP 10 12/13/2021    ESTGFRAFRICA >60 12/13/2021    EGFRNONAA 57 (A) 12/13/2021 12/13 Back to baseline   BMP  Lab Results   Component Value Date     (H) 12/13/2021    K 3.9 12/13/2021     (H) 12/13/2021    CO2 12 (L) 12/13/2021    BUN 27 (H) 12/13/2021    CREATININE 1.2 12/13/2021    CALCIUM 8.2 (L) 12/13/2021    ANIONGAP 10 12/13/2021    ESTGFRAFRICA >60 12/13/2021    EGFRNONAA 57 (A) 12/13/2021         Asymptomatic bacteriuria  He has no UTI sx on interview day of admit  Culture urine  Start rocephin empirically as h/o of UTI and not a great historian but  as noted above I think vitals/lactic acid precipitated by SBO and not UTI sepsis    Cont rocephin and repeat u.a   Pt is s/p lithotripsy, stent removal, and cystoureteroscopy with retrograde pyelogram w/ stent insertion on 5/31/21, tx with Bactrim for MRSA UTI  Will add oral doxy  To cover for staph MRSA UTI .    12/12  Doxy day 3    12/14   d/c abx, grew candida> low colony count and asymptomatic. No further treatment needed.    SIRS (systemic inflammatory response syndrome)  He meets SIRS criteria but I think better explained by SBO presentation than infection.  UA > 100 WBC but he has no urinary tract symptoms. He has recurrent UTI and will start Rocephin now but my clinical impression is the abnormal vitals and elevated lactic acid are due to SBO, dehydration and not UTI sepsis.  RESOLVED .      Hypothyroidism  Hold synthroid while NPO  If remains NPO > 3 days can start IV  Resume oral thyroid meds .    12/11  Hold po meds.    12/14  Continue levothyroxine     GERD (gastroesophageal reflux disease)  protonix IV  tolerating oral - change to po .    12/11  Back to IV   12/14  Cont PPI.    Hyperlipidemia  Holding zocor while NPO  12/10 resume - zocor non formulary- atorvastatin while here - NPO for now will resume once taking po    12/13: simvastatin not formulary. Can resume as outpatient. Hopefully d/c soon.        VTE Risk Mitigation (From admission, onward)         Ordered     enoxaparin injection 100 mg  Every 12 hours (non-standard times)         12/10/21 1846     IP VTE HIGH RISK PATIENT  Once         12/07/21 0620     Place sequential compression device  Until discontinued         12/07/21 0620                Discharge Planning   STEPHANIE:      Code Status: Full Code   Is the patient medically ready for discharge?:     Reason for patient still in hospital (select all that apply): Treatment  Discharge Plan A: Skilled Nursing Facility   Discharge Delays: None known at this time              Andrew Major,  MD  Department of Hospital Medicine   [place] [facility] (3rd Fl)   audio

## 2021-12-22 ENCOUNTER — APPOINTMENT (OUTPATIENT)
Dept: PSYCHIATRY | Facility: CLINIC | Age: 36
End: 2021-12-22
Payer: COMMERCIAL

## 2021-12-22 PROCEDURE — 90837 PSYTX W PT 60 MINUTES: CPT | Mod: 95

## 2022-01-05 ENCOUNTER — APPOINTMENT (OUTPATIENT)
Dept: PSYCHIATRY | Facility: CLINIC | Age: 37
End: 2022-01-05
Payer: COMMERCIAL

## 2022-01-05 PROCEDURE — 90834 PSYTX W PT 45 MINUTES: CPT | Mod: 95

## 2022-01-12 ENCOUNTER — APPOINTMENT (OUTPATIENT)
Dept: PSYCHIATRY | Facility: CLINIC | Age: 37
End: 2022-01-12

## 2022-01-19 ENCOUNTER — APPOINTMENT (OUTPATIENT)
Dept: PSYCHIATRY | Facility: CLINIC | Age: 37
End: 2022-01-19
Payer: COMMERCIAL

## 2022-01-19 PROCEDURE — 90834 PSYTX W PT 45 MINUTES: CPT | Mod: 95

## 2022-01-26 ENCOUNTER — APPOINTMENT (OUTPATIENT)
Dept: PSYCHIATRY | Facility: CLINIC | Age: 37
End: 2022-01-26
Payer: COMMERCIAL

## 2022-01-26 PROCEDURE — 90834 PSYTX W PT 45 MINUTES: CPT | Mod: 95

## 2022-02-02 ENCOUNTER — APPOINTMENT (OUTPATIENT)
Dept: PSYCHIATRY | Facility: CLINIC | Age: 37
End: 2022-02-02

## 2022-02-09 ENCOUNTER — APPOINTMENT (OUTPATIENT)
Dept: PSYCHIATRY | Facility: CLINIC | Age: 37
End: 2022-02-09
Payer: COMMERCIAL

## 2022-02-09 PROCEDURE — 90834 PSYTX W PT 45 MINUTES: CPT | Mod: 95

## 2022-02-16 ENCOUNTER — APPOINTMENT (OUTPATIENT)
Dept: PSYCHIATRY | Facility: CLINIC | Age: 37
End: 2022-02-16

## 2022-02-23 ENCOUNTER — APPOINTMENT (OUTPATIENT)
Dept: PSYCHIATRY | Facility: CLINIC | Age: 37
End: 2022-02-23
Payer: COMMERCIAL

## 2022-02-23 DIAGNOSIS — F32.A ANXIETY DISORDER, UNSPECIFIED: ICD-10-CM

## 2022-02-23 DIAGNOSIS — F41.9 ANXIETY DISORDER, UNSPECIFIED: ICD-10-CM

## 2022-02-23 PROCEDURE — 90834 PSYTX W PT 45 MINUTES: CPT | Mod: 95

## 2022-02-24 PROBLEM — F41.9 ANXIETY AND DEPRESSION: Status: ACTIVE | Noted: 2020-02-13

## 2022-03-09 ENCOUNTER — APPOINTMENT (OUTPATIENT)
Dept: PSYCHIATRY | Facility: CLINIC | Age: 37
End: 2022-03-09

## 2022-03-23 ENCOUNTER — APPOINTMENT (OUTPATIENT)
Dept: PSYCHIATRY | Facility: CLINIC | Age: 37
End: 2022-03-23

## 2024-01-08 ENCOUNTER — APPOINTMENT (OUTPATIENT)
Dept: OBGYN | Facility: CLINIC | Age: 39
End: 2024-01-08
Payer: COMMERCIAL

## 2024-01-08 VITALS
BODY MASS INDEX: 37.56 KG/M2 | SYSTOLIC BLOOD PRESSURE: 144 MMHG | DIASTOLIC BLOOD PRESSURE: 95 MMHG | WEIGHT: 212 LBS | HEIGHT: 63 IN

## 2024-01-08 DIAGNOSIS — Z86.19 PERSONAL HISTORY OF OTHER INFECTIOUS AND PARASITIC DISEASES: ICD-10-CM

## 2024-01-08 DIAGNOSIS — Z01.419 ENCOUNTER FOR GYNECOLOGICAL EXAMINATION (GENERAL) (ROUTINE) W/OUT ABNORMAL FINDINGS: ICD-10-CM

## 2024-01-08 DIAGNOSIS — Z87.898 PERSONAL HISTORY OF OTHER SPECIFIED CONDITIONS: ICD-10-CM

## 2024-01-08 PROCEDURE — 99385 PREV VISIT NEW AGE 18-39: CPT

## 2024-01-08 RX ORDER — CLONAZEPAM 0.5 MG/1
0.5 TABLET ORAL
Qty: 30 | Refills: 0 | Status: COMPLETED | COMMUNITY
Start: 2020-02-13 | End: 2024-01-08

## 2024-01-08 RX ORDER — GABAPENTIN 100 MG/1
100 CAPSULE ORAL
Qty: 120 | Refills: 0 | Status: COMPLETED | COMMUNITY
Start: 2020-05-05 | End: 2024-01-08

## 2024-01-08 RX ORDER — ESCITALOPRAM OXALATE 20 MG/1
20 TABLET ORAL DAILY
Qty: 30 | Refills: 2 | Status: COMPLETED | COMMUNITY
Start: 2020-02-13 | End: 2024-01-08

## 2024-01-08 RX ORDER — TRAZODONE HYDROCHLORIDE 100 MG/1
100 TABLET ORAL
Qty: 30 | Refills: 2 | Status: COMPLETED | COMMUNITY
Start: 2020-03-12 | End: 2024-01-08

## 2024-01-08 RX ORDER — METRONIDAZOLE 500 MG/1
500 TABLET ORAL
Qty: 8 | Refills: 0 | Status: COMPLETED | COMMUNITY
Start: 2019-12-09 | End: 2024-01-08

## 2024-01-08 RX ORDER — VENLAFAXINE HYDROCHLORIDE 150 MG/1
150 CAPSULE, EXTENDED RELEASE ORAL
Qty: 30 | Refills: 2 | Status: COMPLETED | COMMUNITY
Start: 2020-09-30 | End: 2024-01-08

## 2024-01-08 NOTE — HISTORY OF PRESENT ILLNESS
[Patient reported PAP Smear was normal] : Patient reported PAP Smear was normal [FreeTextEntry1] : 39yo  LMP 12/28 (clot) then had nl menses 2d later (5d early), known fibroid uterus s/p myomectomy in 2018. Fibroids have regrown, last U/S shows: [id number] and 5.7X5.6cm fibroids. Menses are regular. She is concerned about bloating and occassional sharp pelvic pain and occassionally rectal pain and is potentially interested in surgery. [PapSmeardate] : 2019

## 2024-01-09 LAB
C TRACH RRNA SPEC QL NAA+PROBE: NOT DETECTED
HBV SURFACE AG SER QL: NONREACTIVE
HCV AB SER QL: NONREACTIVE
HCV S/CO RATIO: 0.21 S/CO
HIV1+2 AB SPEC QL IA.RAPID: NONREACTIVE
N GONORRHOEA RRNA SPEC QL NAA+PROBE: NOT DETECTED
SOURCE AMPLIFICATION: NORMAL
T PALLIDUM AB SER QL IA: NEGATIVE

## 2024-01-10 LAB
CYTOLOGY CVX/VAG DOC THIN PREP: NORMAL
HPV HIGH+LOW RISK DNA PNL CVX: NOT DETECTED

## 2024-07-22 ENCOUNTER — NON-APPOINTMENT (OUTPATIENT)
Age: 39
End: 2024-07-22

## 2024-09-28 ENCOUNTER — NON-APPOINTMENT (OUTPATIENT)
Age: 39
End: 2024-09-28

## 2025-07-12 NOTE — PATIENT PROFILE ADULT. - --S/S CONSISTENT WITH FOLEY [employer]HE[employer] UTI
Patient remains sedated following cath procedure. Cath site dressings CDI with 2+ pulses in feet. Skin warm with CRT 2-3 seconds. Plan to remain flat until 18:45 and discharge once criteria is met. Caregivers at bedside. Plan of care reviewed and questions answered.   
no